# Patient Record
Sex: FEMALE | Race: WHITE | NOT HISPANIC OR LATINO | Employment: STUDENT | URBAN - METROPOLITAN AREA
[De-identification: names, ages, dates, MRNs, and addresses within clinical notes are randomized per-mention and may not be internally consistent; named-entity substitution may affect disease eponyms.]

---

## 2024-08-25 ENCOUNTER — HOSPITAL ENCOUNTER (EMERGENCY)
Facility: HOSPITAL | Age: 29
Discharge: HOME | End: 2024-08-25
Attending: EMERGENCY MEDICINE
Payer: COMMERCIAL

## 2024-08-25 ENCOUNTER — APPOINTMENT (OUTPATIENT)
Dept: CARDIOLOGY | Facility: HOSPITAL | Age: 29
End: 2024-08-25
Payer: COMMERCIAL

## 2024-08-25 VITALS
HEART RATE: 108 BPM | HEIGHT: 68 IN | SYSTOLIC BLOOD PRESSURE: 118 MMHG | OXYGEN SATURATION: 100 % | WEIGHT: 130 LBS | BODY MASS INDEX: 19.7 KG/M2 | RESPIRATION RATE: 18 BRPM | DIASTOLIC BLOOD PRESSURE: 79 MMHG | TEMPERATURE: 98.2 F

## 2024-08-25 DIAGNOSIS — N39.0 URINARY TRACT INFECTION WITHOUT HEMATURIA, SITE UNSPECIFIED: Primary | ICD-10-CM

## 2024-08-25 DIAGNOSIS — F11.13: ICD-10-CM

## 2024-08-25 LAB
ALBUMIN SERPL BCP-MCNC: 4.5 G/DL (ref 3.4–5)
ALP SERPL-CCNC: 72 U/L (ref 33–110)
ALT SERPL W P-5'-P-CCNC: 29 U/L (ref 7–45)
AMPHETAMINES UR QL SCN: ABNORMAL
ANION GAP SERPL CALC-SCNC: 14 MMOL/L (ref 10–20)
APPEARANCE UR: CLEAR
AST SERPL W P-5'-P-CCNC: 25 U/L (ref 9–39)
B-HCG SERPL-ACNC: <2 MIU/ML
BACTERIA #/AREA URNS AUTO: ABNORMAL /HPF
BARBITURATES UR QL SCN: ABNORMAL
BASOPHILS # BLD AUTO: 0.03 X10*3/UL (ref 0–0.1)
BASOPHILS NFR BLD AUTO: 0.5 %
BENZODIAZ UR QL SCN: ABNORMAL
BILIRUB SERPL-MCNC: 1.5 MG/DL (ref 0–1.2)
BILIRUB UR STRIP.AUTO-MCNC: NEGATIVE MG/DL
BUN SERPL-MCNC: 13 MG/DL (ref 6–23)
BZE UR QL SCN: ABNORMAL
CALCIUM SERPL-MCNC: 9.7 MG/DL (ref 8.6–10.3)
CANNABINOIDS UR QL SCN: ABNORMAL
CARDIAC TROPONIN I PNL SERPL HS: 3 NG/L (ref 0–13)
CARDIAC TROPONIN I PNL SERPL HS: <3 NG/L (ref 0–13)
CHLORIDE SERPL-SCNC: 98 MMOL/L (ref 98–107)
CO2 SERPL-SCNC: 26 MMOL/L (ref 21–32)
COLOR UR: ABNORMAL
CREAT SERPL-MCNC: 0.74 MG/DL (ref 0.5–1.05)
EGFRCR SERPLBLD CKD-EPI 2021: >90 ML/MIN/1.73M*2
EOSINOPHIL # BLD AUTO: 0.11 X10*3/UL (ref 0–0.7)
EOSINOPHIL NFR BLD AUTO: 1.9 %
ERYTHROCYTE [DISTWIDTH] IN BLOOD BY AUTOMATED COUNT: 12.2 % (ref 11.5–14.5)
FENTANYL+NORFENTANYL UR QL SCN: ABNORMAL
GLUCOSE SERPL-MCNC: 131 MG/DL (ref 74–99)
GLUCOSE UR STRIP.AUTO-MCNC: NORMAL MG/DL
HCT VFR BLD AUTO: 38.9 % (ref 36–46)
HGB BLD-MCNC: 13.1 G/DL (ref 12–16)
IMM GRANULOCYTES # BLD AUTO: 0.01 X10*3/UL (ref 0–0.7)
IMM GRANULOCYTES NFR BLD AUTO: 0.2 % (ref 0–0.9)
KETONES UR STRIP.AUTO-MCNC: NEGATIVE MG/DL
LEUKOCYTE ESTERASE UR QL STRIP.AUTO: NEGATIVE
LYMPHOCYTES # BLD AUTO: 1.63 X10*3/UL (ref 1.2–4.8)
LYMPHOCYTES NFR BLD AUTO: 28.2 %
MCH RBC QN AUTO: 30.1 PG (ref 26–34)
MCHC RBC AUTO-ENTMCNC: 33.7 G/DL (ref 32–36)
MCV RBC AUTO: 89 FL (ref 80–100)
METHADONE UR QL SCN: ABNORMAL
MONOCYTES # BLD AUTO: 0.37 X10*3/UL (ref 0.1–1)
MONOCYTES NFR BLD AUTO: 6.4 %
MUCOUS THREADS #/AREA URNS AUTO: ABNORMAL /LPF
NEUTROPHILS # BLD AUTO: 3.64 X10*3/UL (ref 1.2–7.7)
NEUTROPHILS NFR BLD AUTO: 62.8 %
NITRITE UR QL STRIP.AUTO: ABNORMAL
NRBC BLD-RTO: 0 /100 WBCS (ref 0–0)
OPIATES UR QL SCN: ABNORMAL
OXYCODONE+OXYMORPHONE UR QL SCN: ABNORMAL
PCP UR QL SCN: ABNORMAL
PH UR STRIP.AUTO: 7.5 [PH]
PLATELET # BLD AUTO: 158 X10*3/UL (ref 150–450)
POTASSIUM SERPL-SCNC: 3.6 MMOL/L (ref 3.5–5.3)
PROT SERPL-MCNC: 7.4 G/DL (ref 6.4–8.2)
PROT UR STRIP.AUTO-MCNC: NEGATIVE MG/DL
RBC # BLD AUTO: 4.35 X10*6/UL (ref 4–5.2)
RBC # UR STRIP.AUTO: NEGATIVE /UL
RBC #/AREA URNS AUTO: ABNORMAL /HPF
SARS-COV-2 RNA RESP QL NAA+PROBE: NOT DETECTED
SODIUM SERPL-SCNC: 134 MMOL/L (ref 136–145)
SP GR UR STRIP.AUTO: 1.01
SQUAMOUS #/AREA URNS AUTO: ABNORMAL /HPF
T4 FREE SERPL-MCNC: 1.07 NG/DL (ref 0.61–1.12)
TSH SERPL-ACNC: 0.26 MIU/L (ref 0.44–3.98)
UROBILINOGEN UR STRIP.AUTO-MCNC: NORMAL MG/DL
WBC # BLD AUTO: 5.8 X10*3/UL (ref 4.4–11.3)
WBC #/AREA URNS AUTO: ABNORMAL /HPF

## 2024-08-25 PROCEDURE — 80307 DRUG TEST PRSMV CHEM ANLYZR: CPT | Performed by: EMERGENCY MEDICINE

## 2024-08-25 PROCEDURE — 81001 URINALYSIS AUTO W/SCOPE: CPT | Mod: 59 | Performed by: EMERGENCY MEDICINE

## 2024-08-25 PROCEDURE — 93005 ELECTROCARDIOGRAM TRACING: CPT

## 2024-08-25 PROCEDURE — 84443 ASSAY THYROID STIM HORMONE: CPT | Performed by: EMERGENCY MEDICINE

## 2024-08-25 PROCEDURE — 84484 ASSAY OF TROPONIN QUANT: CPT | Performed by: EMERGENCY MEDICINE

## 2024-08-25 PROCEDURE — 99284 EMERGENCY DEPT VISIT MOD MDM: CPT

## 2024-08-25 PROCEDURE — 84075 ASSAY ALKALINE PHOSPHATASE: CPT | Performed by: EMERGENCY MEDICINE

## 2024-08-25 PROCEDURE — 96361 HYDRATE IV INFUSION ADD-ON: CPT

## 2024-08-25 PROCEDURE — 2500000001 HC RX 250 WO HCPCS SELF ADMINISTERED DRUGS (ALT 637 FOR MEDICARE OP): Performed by: EMERGENCY MEDICINE

## 2024-08-25 PROCEDURE — 85025 COMPLETE CBC W/AUTO DIFF WBC: CPT | Performed by: EMERGENCY MEDICINE

## 2024-08-25 PROCEDURE — 99285 EMERGENCY DEPT VISIT HI MDM: CPT | Performed by: EMERGENCY MEDICINE

## 2024-08-25 PROCEDURE — 96374 THER/PROPH/DIAG INJ IV PUSH: CPT

## 2024-08-25 PROCEDURE — 2500000004 HC RX 250 GENERAL PHARMACY W/ HCPCS (ALT 636 FOR OP/ED): Performed by: EMERGENCY MEDICINE

## 2024-08-25 PROCEDURE — 87635 SARS-COV-2 COVID-19 AMP PRB: CPT | Performed by: EMERGENCY MEDICINE

## 2024-08-25 PROCEDURE — 84439 ASSAY OF FREE THYROXINE: CPT | Performed by: EMERGENCY MEDICINE

## 2024-08-25 PROCEDURE — 96375 TX/PRO/DX INJ NEW DRUG ADDON: CPT

## 2024-08-25 PROCEDURE — 2500000002 HC RX 250 W HCPCS SELF ADMINISTERED DRUGS (ALT 637 FOR MEDICARE OP, ALT 636 FOR OP/ED): Performed by: EMERGENCY MEDICINE

## 2024-08-25 PROCEDURE — 84702 CHORIONIC GONADOTROPIN TEST: CPT | Performed by: EMERGENCY MEDICINE

## 2024-08-25 PROCEDURE — 36415 COLL VENOUS BLD VENIPUNCTURE: CPT | Performed by: EMERGENCY MEDICINE

## 2024-08-25 PROCEDURE — 2500000001 HC RX 250 WO HCPCS SELF ADMINISTERED DRUGS (ALT 637 FOR MEDICARE OP)

## 2024-08-25 RX ORDER — ALPRAZOLAM 0.25 MG/1
0.25 TABLET ORAL EVERY 6 HOURS PRN
Qty: 15 TABLET | Refills: 0 | Status: SHIPPED | OUTPATIENT
Start: 2024-08-25 | End: 2024-08-30

## 2024-08-25 RX ORDER — CLONIDINE 0.1 MG/24H
1 PATCH, EXTENDED RELEASE TRANSDERMAL
Status: DISCONTINUED | OUTPATIENT
Start: 2024-08-25 | End: 2024-08-25 | Stop reason: HOSPADM

## 2024-08-25 RX ORDER — ONDANSETRON HYDROCHLORIDE 2 MG/ML
4 INJECTION, SOLUTION INTRAVENOUS ONCE
Status: COMPLETED | OUTPATIENT
Start: 2024-08-25 | End: 2024-08-25

## 2024-08-25 RX ORDER — PROMETHAZINE HYDROCHLORIDE 25 MG/1
25 TABLET ORAL ONCE
Status: COMPLETED | OUTPATIENT
Start: 2024-08-25 | End: 2024-08-25

## 2024-08-25 RX ORDER — ALPRAZOLAM 0.5 MG/1
0.25 TABLET ORAL ONCE
Status: COMPLETED | OUTPATIENT
Start: 2024-08-25 | End: 2024-08-25

## 2024-08-25 RX ORDER — KETOROLAC TROMETHAMINE 15 MG/ML
15 INJECTION, SOLUTION INTRAMUSCULAR; INTRAVENOUS ONCE
Status: COMPLETED | OUTPATIENT
Start: 2024-08-25 | End: 2024-08-25

## 2024-08-25 RX ORDER — DICYCLOMINE HYDROCHLORIDE 20 MG/1
20 TABLET ORAL EVERY 8 HOURS PRN
Qty: 15 TABLET | Refills: 0 | Status: SHIPPED | OUTPATIENT
Start: 2024-08-25 | End: 2024-08-30

## 2024-08-25 RX ORDER — CEPHALEXIN 500 MG/1
500 CAPSULE ORAL 2 TIMES DAILY
Qty: 14 CAPSULE | Refills: 0 | Status: SHIPPED | OUTPATIENT
Start: 2024-08-25 | End: 2024-09-01

## 2024-08-25 RX ORDER — PROMETHAZINE HYDROCHLORIDE 25 MG/1
25 TABLET ORAL EVERY 8 HOURS PRN
Qty: 15 TABLET | Refills: 0 | Status: SHIPPED | OUTPATIENT
Start: 2024-08-25 | End: 2024-08-30

## 2024-08-25 RX ORDER — DICYCLOMINE HYDROCHLORIDE 10 MG/1
10 CAPSULE ORAL ONCE
Status: COMPLETED | OUTPATIENT
Start: 2024-08-25 | End: 2024-08-25

## 2024-08-25 RX ORDER — ALPRAZOLAM 0.5 MG/1
0.25 TABLET ORAL EVERY 6 HOURS PRN
Status: DISCONTINUED | OUTPATIENT
Start: 2024-08-25 | End: 2024-08-25 | Stop reason: HOSPADM

## 2024-08-25 RX ORDER — CEPHALEXIN 500 MG/1
500 CAPSULE ORAL ONCE
Status: COMPLETED | OUTPATIENT
Start: 2024-08-25 | End: 2024-08-25

## 2024-08-25 RX ADMIN — PROMETHAZINE HYDROCHLORIDE 25 MG: 25 TABLET ORAL at 15:49

## 2024-08-25 RX ADMIN — ALPRAZOLAM 0.25 MG: 0.5 TABLET ORAL at 14:38

## 2024-08-25 RX ADMIN — KETOROLAC TROMETHAMINE 15 MG: 15 INJECTION, SOLUTION INTRAMUSCULAR; INTRAVENOUS at 17:10

## 2024-08-25 RX ADMIN — DICYCLOMINE HYDROCHLORIDE 10 MG: 10 CAPSULE ORAL at 14:39

## 2024-08-25 RX ADMIN — CEPHALEXIN 500 MG: 500 CAPSULE ORAL at 17:10

## 2024-08-25 RX ADMIN — SODIUM CHLORIDE, POTASSIUM CHLORIDE, SODIUM LACTATE AND CALCIUM CHLORIDE 1000 ML: 600; 310; 30; 20 INJECTION, SOLUTION INTRAVENOUS at 14:05

## 2024-08-25 RX ADMIN — CLONIDINE 1 PATCH: 0.1 PATCH TRANSDERMAL at 15:49

## 2024-08-25 RX ADMIN — ONDANSETRON 4 MG: 2 INJECTION INTRAMUSCULAR; INTRAVENOUS at 14:05

## 2024-08-25 SDOH — HEALTH STABILITY: MENTAL HEALTH: ACTIVE SUICIDAL IDEATION WITH SPECIFIC PLAN AND INTENT (PAST 1 MONTH): NO

## 2024-08-25 SDOH — HEALTH STABILITY: MENTAL HEALTH: WISH TO BE DEAD (PAST 1 MONTH): YES

## 2024-08-25 SDOH — HEALTH STABILITY: MENTAL HEALTH: HOW DID YOU TRY TO KILL YOURSELF?: GATHERED MATERIAL TO HANG HERSELF

## 2024-08-25 SDOH — HEALTH STABILITY: MENTAL HEALTH: WHEN DID YOU TRY TO KILL YOURSELF?: THIS WAS A COUPLE YEARS AGO

## 2024-08-25 SDOH — HEALTH STABILITY: MENTAL HEALTH: IN THE PAST FEW WEEKS, HAVE YOU WISHED YOU WERE DEAD?: YES

## 2024-08-25 SDOH — HEALTH STABILITY: MENTAL HEALTH

## 2024-08-25 SDOH — SOCIAL STABILITY: SOCIAL NETWORK: EMOTIONAL SUPPORT GIVEN: REASSURE

## 2024-08-25 SDOH — HEALTH STABILITY: MENTAL HEALTH: HAVE YOU EVER TRIED TO KILL YOURSELF?: YES

## 2024-08-25 SDOH — HEALTH STABILITY: MENTAL HEALTH: SUICIDE ASSESSMENT: ADULT (C-SSRS)

## 2024-08-25 SDOH — HEALTH STABILITY: MENTAL HEALTH: SLEEP PATTERN: NAPS DURING THE DAY

## 2024-08-25 SDOH — HEALTH STABILITY: MENTAL HEALTH: ARE YOU HAVING THOUGHTS OF KILLING YOURSELF RIGHT NOW?: NO

## 2024-08-25 SDOH — HEALTH STABILITY: MENTAL HEALTH: IN THE PAST WEEK, HAVE YOU BEEN HAVING THOUGHTS ABOUT KILLING YOURSELF?: NO

## 2024-08-25 SDOH — ECONOMIC STABILITY: HOUSING INSECURITY

## 2024-08-25 SDOH — HEALTH STABILITY: MENTAL HEALTH: SUICIDAL BEHAVIOR (LIFETIME): YES

## 2024-08-25 SDOH — SOCIAL STABILITY: SOCIAL NETWORK: VISITOR BEHAVIORS: UNABLE TO ASSESS

## 2024-08-25 SDOH — HEALTH STABILITY: MENTAL HEALTH: SUICIDAL BEHAVIOR (3 MONTHS): NO

## 2024-08-25 SDOH — HEALTH STABILITY: MENTAL HEALTH: HAVE YOU WISHED YOU WERE DEAD OR WISHED YOU COULD GO TO SLEEP AND NOT WAKE UP?: YES

## 2024-08-25 SDOH — HEALTH STABILITY: MENTAL HEALTH: IN THE PAST FEW WEEKS, HAVE YOU FELT THAT YOU OR YOUR FAMILY WOULD BE BETTER OFF IF YOU WERE DEAD?: NO

## 2024-08-25 SDOH — HEALTH STABILITY: MENTAL HEALTH: NEEDS EXPRESSED: PHYSICAL

## 2024-08-25 SDOH — HEALTH STABILITY: MENTAL HEALTH: SUICIDAL BEHAVIOR (DESCRIPTION): WOULD LIKE TO DIE FROM TIME TO TIME BUT HAS NO INTENTION OF ACTING ON IT AT THIS TIME.

## 2024-08-25 SDOH — SOCIAL STABILITY: SOCIAL INSECURITY: FAMILY BEHAVIORS: UNABLE TO ASSESS

## 2024-08-25 SDOH — HEALTH STABILITY: MENTAL HEALTH: CONTENT: UNREMARKABLE

## 2024-08-25 SDOH — HEALTH STABILITY: MENTAL HEALTH: BEHAVIORS/MOOD: FLAT AFFECT;COOPERATIVE;CALM

## 2024-08-25 SDOH — SOCIAL STABILITY: SOCIAL NETWORK: PARENT/GUARDIAN/SIGNIFICANT OTHER INVOLVEMENT: NO INVOLVEMENT

## 2024-08-25 SDOH — HEALTH STABILITY: MENTAL HEALTH: ACTIVE SUICIDAL IDEATION WITH SOME INTENT TO ACT, WITHOUT SPECIFIC PLAN (PAST 1 MONTH): NO

## 2024-08-25 SDOH — HEALTH STABILITY: MENTAL HEALTH: NON-SPECIFIC ACTIVE SUICIDAL THOUGHTS (PAST 1 MONTH): NO

## 2024-08-25 SDOH — HEALTH STABILITY: MENTAL HEALTH: HAVE YOU ACTUALLY HAD ANY THOUGHTS OF KILLING YOURSELF?: YES

## 2024-08-25 ASSESSMENT — LIFESTYLE VARIABLES
EVER FELT BAD OR GUILTY ABOUT YOUR DRINKING: NO
ORIENTATION AND CLOUDING OF SENSORIUM: ORIENTED AND CAN DO SERIAL ADDITIONS
TOTAL SCORE: 0
NAUSEA AND VOMITING: MILD NAUSEA WITH NO VOMITING
AGITATION: NORMAL ACTIVITY
HAVE YOU EVER FELT YOU SHOULD CUT DOWN ON YOUR DRINKING: NO
TOTAL SCORE: 4
ANXIETY: MILDLY ANXIOUS
HAVE PEOPLE ANNOYED YOU BY CRITICIZING YOUR DRINKING: NO
TACTILE DISTURBANCES: VERY MILD ITCHING, PINS AND NEEDLES, BURNING OR NUMBNESS
AUDITORY DISTURBANCES: NOT PRESENT
VISUAL DISTURBANCES: NOT PRESENT
PAROXYSMAL SWEATS: BARELY PERCEPTIBLE SWEATING, PALMS MOIST
HEADACHE, FULLNESS IN HEAD: NOT PRESENT
TREMOR: NO TREMOR
EVER HAD A DRINK FIRST THING IN THE MORNING TO STEADY YOUR NERVES TO GET RID OF A HANGOVER: NO

## 2024-08-25 ASSESSMENT — COLUMBIA-SUICIDE SEVERITY RATING SCALE - C-SSRS
5. HAVE YOU STARTED TO WORK OUT OR WORKED OUT THE DETAILS OF HOW TO KILL YOURSELF? DO YOU INTEND TO CARRY OUT THIS PLAN?: NO
2. HAVE YOU ACTUALLY HAD ANY THOUGHTS OF KILLING YOURSELF?: YES
1. IN THE PAST MONTH, HAVE YOU WISHED YOU WERE DEAD OR WISHED YOU COULD GO TO SLEEP AND NOT WAKE UP?: YES
4. HAVE YOU HAD THESE THOUGHTS AND HAD SOME INTENTION OF ACTING ON THEM?: NO
6. HAVE YOU EVER DONE ANYTHING, STARTED TO DO ANYTHING, OR PREPARED TO DO ANYTHING TO END YOUR LIFE?: YES
6. HAVE YOU EVER DONE ANYTHING, STARTED TO DO ANYTHING, OR PREPARED TO DO ANYTHING TO END YOUR LIFE?: YES

## 2024-08-25 ASSESSMENT — PAIN SCALES - GENERAL
PAINLEVEL_OUTOF10: 4
PAINLEVEL_OUTOF10: 7
PAINLEVEL_OUTOF10: 4

## 2024-08-25 ASSESSMENT — PAIN - FUNCTIONAL ASSESSMENT
PAIN_FUNCTIONAL_ASSESSMENT: 0-10
PAIN_FUNCTIONAL_ASSESSMENT: 0-10

## 2024-08-25 NOTE — ED PROVIDER NOTES
HPI   Chief Complaint   Patient presents with    Withdrawal     Woke up this morning and felt really out of it. Pt states pain all over. Wants help/detox. Used a day or two ago not really sure. Slept for a long time. Police were called to home due to pt being reckless per family. Has warrants but not under arrest.        The patient is medically cleared.    This is a 29-year-old female who presents to the emergency room and withdrawal.  The patient states that she uses Xanax 1 mg on a daily basis, heroin that she smokes daily, and intermittently uses methamphetamine.  She states that she last did Xanax about 2 days ago, and lasted heroin yesterday.  Woke up today and feels that she is in withdrawals.  She has aches and pains all over, nausea, abdominal cramping, and feels sweaty hot and clammy.  She never been through withdrawals before and is looking for help.  She was trying to get placed into rehab but her family would not let her go to the detox center.  And they called police today.  She is not under percent sure why they called the police but she did have a passing episode at home if she is to get some blood on her foot because her own blood does make her pass out.  She otherwise has no chest pain or shortness of breath.  The patient states that she did have thoughts of hurting herself when they called the police because she did not want to go to detention, and frequently does think about hurting herself but does not have an actual plan.  She does not believe that she needs any evaluation for depression as she would not actually do it but again does state that she thinks about it frequently.    At this time she denies being pregnant.  But does not recall her last menstrual period.  She has no known drug allergies but is allergic to eggs.  Has not been COVID vaccinated.  Has not had any fevers or cough or congestion in the past few days.  She does not utilize any IV drugs.              Patient History   History  reviewed. No pertinent past medical history.  History reviewed. No pertinent surgical history.  No family history on file.  Social History     Tobacco Use    Smoking status: Every Day     Current packs/day: 0.25     Types: Cigarettes    Smokeless tobacco: Never   Substance Use Topics    Alcohol use: Never    Drug use: Yes     Types: Methamphetamines       Physical Exam   ED Triage Vitals [08/25/24 1307]   Temperature Heart Rate Respirations BP   36.8 °C (98.2 °F) (!) 128 20 132/80      Pulse Ox Temp src Heart Rate Source Patient Position   100 % -- Monitor Lying      BP Location FiO2 (%)     Left arm --       Physical Exam  Constitutional:       Appearance: Normal appearance. She is normal weight.   HENT:      Head: Normocephalic.      Nose: Nose normal.      Mouth/Throat:      Mouth: Mucous membranes are dry.   Eyes:      Extraocular Movements: Extraocular movements intact.      Conjunctiva/sclera: Conjunctivae normal.      Pupils: Pupils are equal, round, and reactive to light.   Cardiovascular:      Rate and Rhythm: Regular rhythm. Tachycardia present.      Heart sounds: No murmur heard.  Pulmonary:      Effort: Pulmonary effort is normal.      Breath sounds: Normal breath sounds.   Abdominal:      General: Abdomen is flat. Bowel sounds are normal.      Palpations: Abdomen is soft.   Musculoskeletal:         General: No swelling or tenderness. Normal range of motion.      Cervical back: Normal range of motion.      Right lower leg: No edema.      Left lower leg: No edema.   Skin:     General: Skin is warm.      Capillary Refill: Capillary refill takes less than 2 seconds.   Neurological:      General: No focal deficit present.      Mental Status: She is alert and oriented to person, place, and time.           ED Course & MDM   Diagnoses as of 08/25/24 1800   Urinary tract infection without hematuria, site unspecified   Opioid abuse with withdrawal (Multi)                 No data recorded                                  Medical Decision Making  The patient was seen by the resident/fellow.  I have personally performed a substantive portion of the encounter.  I have seen and examined the patient; agree with the workup, evaluation, MDM, management and diagnosis.  The care plan has been discussed with the resident; I have reviewed the resident's note and agree with the documented findings.      At this time, the patient does take Xanax daily 1 mg therefore we will wean off of the Xanax and start her here at 0.25 mg I notified the patient that Xanax withdrawal can be deadly and has to be weaned over 5 or 6 days.  The heroin withdrawal is symptom control and the patient be given clonidine, Bentyl, Phenergan, IV fluids and Toradol.  Will wait on the Toradol to make sure she is not pregnant first before providing it.  The other medications will be started now.    Given the patient has had thoughts of hurting herself in the past and per her words frequently, we will also obtain an EPAT evaluation.  If she is cleared from EPAT, then [facility] will be utilized to help her get into a rehab facility or detox facility.    The patient was seen by EPAT, and she was deemed low risk.  Therefore she is cleared to leave at her leisure as she is not suicidal has no plan at this time.  She did state to talk to [facility], and spoke with Enoch who is going to try to get her placed this evening.  He is supposed to be on his way up here, and they are discussed the plan.  In the meantime if they cannot take her, I have sent meds to her pharmacy for UTI including Keflex and for withdrawal from Xanax including Xanax low-dose and short course to wean over the next 5 days, Phenergan, Bentyl, and she is to keep the clonidine patch on as well.        Procedure  Procedures     Savanna Costello MD  Resident  08/25/24 3734       Nemesio Calle DO  08/25/24 8403

## 2024-08-25 NOTE — PROGRESS NOTES
EPAT - Social Work Psychiatric Assessment    Arrival Details  Mode of Arrival: Ambulance  Admission Source: Home  Admission Type: Voluntary  EPAT Assessment Start Date: 08/25/24  EPAT Assessment Start Time: 1601  Name of : TIMMY Stein    History of Present Illness  Admission Reason: Psychiatric assessment ordered  HPI: A review of previous and current electronic records was conducted prior to the patient interview. Circumstance in which the patient has presented to the ED and initial clinical impressions as documented in the ED Provider Note upon admission: This is a 29-year-old female who presents to the emergency room and withdrawal.  The patient states that she uses Xanax 1 mg on a daily basis, heroin that she smokes daily, and intermittently uses methamphetamine.  She states that she last did Xanax about 2 days ago, and lasted heroin yesterday.  Woke up today and feels that she is in withdrawals.  She has aches and pains all over, nausea, abdominal cramping, and feels sweaty hot and clammy.  She never been through withdrawals before and is looking for help.  She was trying to get placed into rehab but her family would not let her go to the detox center.  And they called police today.  She is not under percent sure why they called the police but she did have a passing episode at home if she is to get some blood on her foot because her own blood does make her pass out.  She otherwise has no chest pain or shortness of breath.  The patient states that she did have thoughts of hurting herself when they called the police because she did not want to go to care home, and frequently does think about hurting herself but does not have an actual plan.  She does not believe that she needs any evaluation for depression as she would not actually do it but again does state that she thinks about it frequently.     ED Provider reason for psych assessment referral: Given the patient has had thoughts of hurting herself  in the past and per her words frequently, we will also obtain an EPAT evaluation.  If she is cleared from EPAT, then [facility] will be utilized to help her get into a rehab facility or detox facility.    SW Readmission Information   Readmission within 30 Days: No    Brief MSE w/ inclusion of psychiatric symptoms.  General: Sitting on bed for interview by video, calm.  Orientation: x3   Mood: Depressed. Details: She is prone to be pessimistic, have low energy, feelings of dysphoria, difficulty concentrating, some sleep difficulty.  Affect: Limited range, as appropriate for the situation  Psychiatric symptoms re HI/SI: The patient does not endorse SI outside of controllable fleeting thoughts with no intention of developing plan, no HI or any psychotic symptoms.   Psychotic symptoms: None. The patient does not endorse nor demonstrate presence of internal stimulation, hallucinations or delusions.  Insight/Judgment: Poor prior to this ED visit, but good now that she has chosen to enter into detox and recovery again.    Past Psychiatric History/Meds/Treatments  Past Psychiatric History: Previous Psychiatric Inpatient Hospitalizations: When she had suicide plan 2 years ago.   Previous Substance Abuse Treatment: Yes. [facility], [facility]  Past Psychiatric Meds/Treatments: None. She agreed to the suggestion of seeking psychiatry for assessment for mood medication upon conclusion of detox presently.    Current Mental Health Contacts   Name/Phone Number: None  Provider Name/Phone Number: None    Support System: Immediate family; Significant other; Mother    Living Arrangement: House  Feels Safe Living in Home: Yes    Current or Previous  Service: None  Social/Cultural History  Cultural Requests During Hospitalization: None  Spiritual Requests During Hospitalization: None  Legal Concerns: None at this time; Had been in retirement previously    Drug Screening  Have you used any  "substances (canabis, cocaine, heroin, hallucinogens, inhalants, etc.) in the past 12 months?: Yes  Have you used any prescription drugs other than prescribed in the past 12 months?: Yes  Is a toxicology screen needed?: Yes    Stage of Change: Preparation; Plans on returning to detox and recovery program immediately.    Drug use history  Substance abuse note imported via [employer] 3/25/24    Patient is addicted to heroin, Xanax daily 1 mg, as well as intermittent methamphetamine.  Has stopped all Xanax for 2 days, and stopped heroin as well since yesterday and is feeling withdrawal symptoms.  Also feels like frequently that she wants to die and has thought about it but does not have a plan.  Did think about it today when her mom called the police because she did not want to go to prison but again it was a fleeting thought.  Request evaluation for suicidal ideation and depression with drug abuse possible dual diagnosis admission. Result/Plan: The patient was recommended to IOP upon discharge.    Substance Use History (type, frequency, amount, route of use, onset of use, last use):   Opioids: 1g/day, denies IVDU  Alcohol: denies  Benzodiazepines: reports xanax \"couple bars\" most days  Cocaine: denies  PCP: denies  Methamphetamine/ecstasy: denies  Cannabis: denies     Violence Risk Assessment  Assessment of Violence: On admission  Thoughts of Harm to Others: No    Self Harm/Suicidal Ideation Plan: None other than fleeting thoughts  Access to Lethal Methods : No    Ability to Assess Risk Screen  Risk Screen - Ability to Assess: Able to be screened  Ask Suicide-Screening Questions  1. In the past few weeks, have you wished you were dead?: Yes  2. In the past few weeks, have you felt that you or your family would be better off if you were dead?: No  3. In the past week, have you been having thoughts about killing yourself?: No  4. Have you ever tried to kill yourself?: Yes  How did you try to kill yourself?: " gathered material to hang herself  When did you try to kill yourself?: this was a couple years ago  5. Are you having thoughts of killing yourself right now?: No  Calculated Risk Score: Potential Risk  [place] Suicide Severity Rating Scale (Screener/Recent Self-Report)  1. Wish to be Dead (Past 1 Month): Yes  2. Non-Specific Active Suicidal Thoughts (Past 1 Month): No  3. Active Suicidal Ideation with any Methods (Not Plan) Without Intent to Act (Past 1 Month): No  4. Active Suicidal Ideation with Some Intent to Act, Without Specific Plan (Past 1 Month): No  5. Active Suicidal Ideation with Specific Plan and Intent (Past 1 Month): No  6. Suicidal Behavior (Lifetime): Yes  6. Suicidal Behavior (3 Months): No  6. Suicidal Behavior (Description): would like to die from time to time but has no intention of acting on it at this time.  Calculated C-SSRS Risk Score (Lifetime/Recent): Moderate Risk    Clinical Interpretation Documentation of SI risk  Risk Level: Low suicide risk, considering remote attempt yrs ago and no plans currenlty/recently    Psychiatric Impression and Plan of Care    DIAGNOSTIC IMPRESSION based upon previous and current evaluative information: benzodiazepine dependence; opioid dependence; major depressive episode, recurrent, moderate    ASSESSMENT NOTES: The patient was interviewed for suicide risk, which yielded low risk result. The nursing note shows a response of suicide behavior over the past 3 months, which fortunately is incorrect. The patient clarified that she gathered materials to attempt hanging 2 years ago. That was the only incident. As stated in the provider note, her recent SI has been fleeting and she has no intent or desire to develop any plan. She has had depression symptoms, never been on an anti-depressant, so I did recommend she talk with psychiatry after detox. She wants to consult with [facility] for detox and recovery. [facility] has been contacted and will meet with her to offer  placement today.     CONTRIBUTING factors to this ED visit that were present in prior assessments: Relapse; Perhaps need for an anti-depressant that has not been previously considered.    [facility] services considered to address substance use: Yes. Referred as plan of care    AGITATION Assessment: Is patient presenting as agitated? No    PLAN OF CARE: DISCHARGE. EPAT consulted with Dr. Calle after psych assessment, concurring that she is actually low risk for psych and appropriate for [facility] referral, which she is requesting.   Specific Resources Provided to Patient: [facility] to meet presently  CM Notified: n/a  PHP/IOP Recommended: Deferred to CD treatment team    Outcome/Disposition  Patient's Perception of Outcome Achieved: Patient in agreement with plan  Assessment, Recommendations and Risk Level Reviewed with: Dr. Calle  Contact Names:    Kika Benderaurora (Parent)  - [phone]   Rohit Ortiz (Significant Other)  - [phone]  EPAT Assessment Completed Time: 1629  Patient Disposition: Home

## 2024-08-25 NOTE — DISCHARGE INSTRUCTIONS
Thank you for visiting the [facility] emergency department.  Please follow-up with Enoch, the representative from [facility] to further assist you with your care.  Please also follow-up with your referred primary care physician, Dr. Taylor, for additional medical and psychiatric management.  Please come back to the emergency department if you experience any any new or worsening medical or psychological disturbances.

## 2024-08-25 NOTE — PROGRESS NOTES
Social Work Note - Brief Psychiatric Assessment note    This is a brief summary, to be replaced by a comprehensive assessment note momentarily.  The patient was interviewed for suicide risk, which yielded low risk result. The nursing note shows a response of suicide behavior over the past 3 months, which fortunately is incorrect. The patient clarified that she gathered materials to attempt hanging 2 years ago. That was the only incident. As stated in the provider note, her recent SI has been fleeting and she has no intent or desire to develop any plan. She has had depression symptoms, never been on an anti-depressant, so I did recommend she talk with psychiatry after detox. She wants to consult with [facility] for detox and recovery. [facility] has been contacted and will meet with her to offer placement today.

## 2024-08-26 LAB
ATRIAL RATE: 108 BPM
HOLD SPECIMEN: NORMAL
P AXIS: 66 DEGREES
P OFFSET: 208 MS
P ONSET: 151 MS
PR INTERVAL: 140 MS
Q ONSET: 221 MS
QRS COUNT: 18 BEATS
QRS DURATION: 104 MS
QT INTERVAL: 346 MS
QTC CALCULATION(BAZETT): 463 MS
QTC FREDERICIA: 420 MS
R AXIS: 42 DEGREES
T AXIS: 50 DEGREES
T OFFSET: 394 MS
VENTRICULAR RATE: 108 BPM

## 2024-09-18 LAB
ATRIAL RATE: 108 BPM
P AXIS: 66 DEGREES
P OFFSET: 208 MS
P ONSET: 151 MS
PR INTERVAL: 140 MS
Q ONSET: 221 MS
QRS COUNT: 18 BEATS
QRS DURATION: 104 MS
QT INTERVAL: 346 MS
QTC CALCULATION(BAZETT): 463 MS
QTC FREDERICIA: 420 MS
R AXIS: 42 DEGREES
T AXIS: 50 DEGREES
T OFFSET: 394 MS
VENTRICULAR RATE: 108 BPM

## 2024-09-28 ENCOUNTER — HOSPITAL ENCOUNTER (INPATIENT)
Facility: HOSPITAL | Age: 29
End: 2024-09-28
Attending: EMERGENCY MEDICINE | Admitting: INTERNAL MEDICINE
Payer: COMMERCIAL

## 2024-09-28 ENCOUNTER — APPOINTMENT (OUTPATIENT)
Dept: CARDIOLOGY | Facility: HOSPITAL | Age: 29
End: 2024-09-28
Payer: COMMERCIAL

## 2024-09-28 DIAGNOSIS — F13.930: Primary | ICD-10-CM

## 2024-09-28 DIAGNOSIS — R11.2 NAUSEA AND VOMITING, UNSPECIFIED VOMITING TYPE: ICD-10-CM

## 2024-09-28 DIAGNOSIS — E56.9 VITAMIN DEFICIENCY: ICD-10-CM

## 2024-09-28 DIAGNOSIS — R10.9 ABDOMINAL DISCOMFORT: ICD-10-CM

## 2024-09-28 DIAGNOSIS — F41.9 ANXIETY: ICD-10-CM

## 2024-09-28 DIAGNOSIS — G47.00 INSOMNIA, UNSPECIFIED TYPE: ICD-10-CM

## 2024-09-28 PROBLEM — F11.93 HEROIN WITHDRAWAL (MULTI): Status: ACTIVE | Noted: 2024-09-28

## 2024-09-28 PROBLEM — F15.20 METHAMPHETAMINE DEPENDENCE (MULTI): Chronic | Status: ACTIVE | Noted: 2024-09-28

## 2024-09-28 PROBLEM — R45.851 SUICIDAL IDEATION: Status: ACTIVE | Noted: 2024-09-28

## 2024-09-28 LAB
ALBUMIN SERPL BCP-MCNC: 4.3 G/DL (ref 3.4–5)
ALP SERPL-CCNC: 90 U/L (ref 33–110)
ALT SERPL W P-5'-P-CCNC: 56 U/L (ref 7–45)
AMPHETAMINES UR QL SCN: ABNORMAL
ANION GAP SERPL CALC-SCNC: 11 MMOL/L (ref 10–20)
APAP SERPL-MCNC: <10 UG/ML
AST SERPL W P-5'-P-CCNC: 36 U/L (ref 9–39)
ATRIAL RATE: 121 BPM
B-HCG SERPL-ACNC: <2 MIU/ML
BARBITURATES UR QL SCN: ABNORMAL
BASOPHILS # BLD AUTO: 0.03 X10*3/UL (ref 0–0.1)
BASOPHILS NFR BLD AUTO: 0.6 %
BENZODIAZ UR QL SCN: ABNORMAL
BILIRUB SERPL-MCNC: 1.2 MG/DL (ref 0–1.2)
BUN SERPL-MCNC: 21 MG/DL (ref 6–23)
BZE UR QL SCN: ABNORMAL
CALCIUM SERPL-MCNC: 9 MG/DL (ref 8.6–10.3)
CANNABINOIDS UR QL SCN: ABNORMAL
CHLORIDE SERPL-SCNC: 104 MMOL/L (ref 98–107)
CO2 SERPL-SCNC: 26 MMOL/L (ref 21–32)
CREAT SERPL-MCNC: 1.02 MG/DL (ref 0.5–1.05)
EGFRCR SERPLBLD CKD-EPI 2021: 77 ML/MIN/1.73M*2
EOSINOPHIL # BLD AUTO: 0.07 X10*3/UL (ref 0–0.7)
EOSINOPHIL NFR BLD AUTO: 1.3 %
ERYTHROCYTE [DISTWIDTH] IN BLOOD BY AUTOMATED COUNT: 12.7 % (ref 11.5–14.5)
ETHANOL SERPL-MCNC: <10 MG/DL
FENTANYL+NORFENTANYL UR QL SCN: ABNORMAL
GLUCOSE SERPL-MCNC: 119 MG/DL (ref 74–99)
HCT VFR BLD AUTO: 35.3 % (ref 36–46)
HGB BLD-MCNC: 11.5 G/DL (ref 12–16)
HIV 1+2 AB+HIV1 P24 AG SERPL QL IA: NONREACTIVE
IMM GRANULOCYTES # BLD AUTO: 0.01 X10*3/UL (ref 0–0.7)
IMM GRANULOCYTES NFR BLD AUTO: 0.2 % (ref 0–0.9)
LYMPHOCYTES # BLD AUTO: 1.16 X10*3/UL (ref 1.2–4.8)
LYMPHOCYTES NFR BLD AUTO: 22.3 %
MCH RBC QN AUTO: 29.9 PG (ref 26–34)
MCHC RBC AUTO-ENTMCNC: 32.6 G/DL (ref 32–36)
MCV RBC AUTO: 92 FL (ref 80–100)
METHADONE UR QL SCN: ABNORMAL
MONOCYTES # BLD AUTO: 0.28 X10*3/UL (ref 0.1–1)
MONOCYTES NFR BLD AUTO: 5.4 %
NEUTROPHILS # BLD AUTO: 3.65 X10*3/UL (ref 1.2–7.7)
NEUTROPHILS NFR BLD AUTO: 70.2 %
NRBC BLD-RTO: 0 /100 WBCS (ref 0–0)
OPIATES UR QL SCN: ABNORMAL
OXYCODONE+OXYMORPHONE UR QL SCN: ABNORMAL
P AXIS: 67 DEGREES
P OFFSET: 208 MS
P ONSET: 155 MS
PCP UR QL SCN: ABNORMAL
PLATELET # BLD AUTO: 161 X10*3/UL (ref 150–450)
POTASSIUM SERPL-SCNC: 4 MMOL/L (ref 3.5–5.3)
PR INTERVAL: 150 MS
PROT SERPL-MCNC: 7.3 G/DL (ref 6.4–8.2)
Q ONSET: 230 MS
QRS COUNT: 20 BEATS
QRS DURATION: 88 MS
QT INTERVAL: 302 MS
QTC CALCULATION(BAZETT): 428 MS
QTC FREDERICIA: 381 MS
R AXIS: 61 DEGREES
RBC # BLD AUTO: 3.84 X10*6/UL (ref 4–5.2)
SALICYLATES SERPL-MCNC: <3 MG/DL
SODIUM SERPL-SCNC: 137 MMOL/L (ref 136–145)
T AXIS: 58 DEGREES
T OFFSET: 381 MS
T4 FREE SERPL-MCNC: 1.11 NG/DL (ref 0.61–1.12)
TSH SERPL-ACNC: 0.25 MIU/L (ref 0.44–3.98)
VENTRICULAR RATE: 121 BPM
WBC # BLD AUTO: 5.2 X10*3/UL (ref 4.4–11.3)

## 2024-09-28 PROCEDURE — 2500000004 HC RX 250 GENERAL PHARMACY W/ HCPCS (ALT 636 FOR OP/ED): Performed by: NURSE PRACTITIONER

## 2024-09-28 PROCEDURE — 99285 EMERGENCY DEPT VISIT HI MDM: CPT | Performed by: EMERGENCY MEDICINE

## 2024-09-28 PROCEDURE — 99285 EMERGENCY DEPT VISIT HI MDM: CPT

## 2024-09-28 PROCEDURE — 82947 ASSAY GLUCOSE BLOOD QUANT: CPT | Performed by: EMERGENCY MEDICINE

## 2024-09-28 PROCEDURE — 96361 HYDRATE IV INFUSION ADD-ON: CPT

## 2024-09-28 PROCEDURE — 84443 ASSAY THYROID STIM HORMONE: CPT

## 2024-09-28 PROCEDURE — 2500000001 HC RX 250 WO HCPCS SELF ADMINISTERED DRUGS (ALT 637 FOR MEDICARE OP)

## 2024-09-28 PROCEDURE — 87389 HIV-1 AG W/HIV-1&-2 AB AG IA: CPT | Mod: STJLAB

## 2024-09-28 PROCEDURE — 93005 ELECTROCARDIOGRAM TRACING: CPT

## 2024-09-28 PROCEDURE — 80320 DRUG SCREEN QUANTALCOHOLS: CPT | Performed by: EMERGENCY MEDICINE

## 2024-09-28 PROCEDURE — 2500000004 HC RX 250 GENERAL PHARMACY W/ HCPCS (ALT 636 FOR OP/ED)

## 2024-09-28 PROCEDURE — 84702 CHORIONIC GONADOTROPIN TEST: CPT | Performed by: EMERGENCY MEDICINE

## 2024-09-28 PROCEDURE — 2500000005 HC RX 250 GENERAL PHARMACY W/O HCPCS

## 2024-09-28 PROCEDURE — 85025 COMPLETE CBC W/AUTO DIFF WBC: CPT | Performed by: EMERGENCY MEDICINE

## 2024-09-28 PROCEDURE — 2500000001 HC RX 250 WO HCPCS SELF ADMINISTERED DRUGS (ALT 637 FOR MEDICARE OP): Performed by: NURSE PRACTITIONER

## 2024-09-28 PROCEDURE — 36415 COLL VENOUS BLD VENIPUNCTURE: CPT

## 2024-09-28 PROCEDURE — 84439 ASSAY OF FREE THYROXINE: CPT

## 2024-09-28 PROCEDURE — 94760 N-INVAS EAR/PLS OXIMETRY 1: CPT

## 2024-09-28 PROCEDURE — 2500000002 HC RX 250 W HCPCS SELF ADMINISTERED DRUGS (ALT 637 FOR MEDICARE OP, ALT 636 FOR OP/ED): Performed by: NURSE PRACTITIONER

## 2024-09-28 PROCEDURE — 1200000002 HC GENERAL ROOM WITH TELEMETRY DAILY

## 2024-09-28 PROCEDURE — 80307 DRUG TEST PRSMV CHEM ANLYZR: CPT | Performed by: EMERGENCY MEDICINE

## 2024-09-28 PROCEDURE — 36415 COLL VENOUS BLD VENIPUNCTURE: CPT | Performed by: EMERGENCY MEDICINE

## 2024-09-28 PROCEDURE — 96360 HYDRATION IV INFUSION INIT: CPT

## 2024-09-28 RX ORDER — FOLIC ACID 1 MG/1
1 TABLET ORAL DAILY
Status: DISCONTINUED | OUTPATIENT
Start: 2024-09-28 | End: 2024-09-30 | Stop reason: HOSPADM

## 2024-09-28 RX ORDER — HYDROXYZINE HYDROCHLORIDE 25 MG/1
25 TABLET, FILM COATED ORAL EVERY 4 HOURS PRN
Status: DISCONTINUED | OUTPATIENT
Start: 2024-09-28 | End: 2024-09-30 | Stop reason: HOSPADM

## 2024-09-28 RX ORDER — DIAZEPAM 5 MG/ML
10 INJECTION, SOLUTION INTRAMUSCULAR; INTRAVENOUS EVERY 2 HOUR PRN
Status: DISCONTINUED | OUTPATIENT
Start: 2024-09-28 | End: 2024-09-28

## 2024-09-28 RX ORDER — ACETAMINOPHEN 325 MG/1
650 TABLET ORAL EVERY 4 HOURS PRN
Status: DISCONTINUED | OUTPATIENT
Start: 2024-09-28 | End: 2024-09-28

## 2024-09-28 RX ORDER — DICYCLOMINE HYDROCHLORIDE 10 MG/1
10 CAPSULE ORAL EVERY 6 HOURS PRN
Status: DISCONTINUED | OUTPATIENT
Start: 2024-09-28 | End: 2024-09-30 | Stop reason: HOSPADM

## 2024-09-28 RX ORDER — QUETIAPINE FUMARATE 25 MG/1
25 TABLET, FILM COATED ORAL EVERY 12 HOURS
Status: DISCONTINUED | OUTPATIENT
Start: 2024-09-28 | End: 2024-09-30 | Stop reason: HOSPADM

## 2024-09-28 RX ORDER — ACETAMINOPHEN 325 MG/1
650 TABLET ORAL EVERY 6 HOURS PRN
Status: DISCONTINUED | OUTPATIENT
Start: 2024-09-28 | End: 2024-09-30 | Stop reason: HOSPADM

## 2024-09-28 RX ORDER — BUPRENORPHINE 2 MG/1
4 TABLET SUBLINGUAL ONCE AS NEEDED
Status: COMPLETED | OUTPATIENT
Start: 2024-09-28 | End: 2024-09-29

## 2024-09-28 RX ORDER — ACETAMINOPHEN 325 MG/1
650 TABLET ORAL EVERY 4 HOURS PRN
Status: DISCONTINUED | OUTPATIENT
Start: 2024-09-28 | End: 2024-09-30 | Stop reason: HOSPADM

## 2024-09-28 RX ORDER — MULTIVIT-MIN/IRON FUM/FOLIC AC 7.5 MG-4
1 TABLET ORAL DAILY
Status: DISCONTINUED | OUTPATIENT
Start: 2024-09-28 | End: 2024-09-30 | Stop reason: HOSPADM

## 2024-09-28 RX ORDER — LORAZEPAM 1 MG/1
2 TABLET ORAL EVERY 2 HOUR PRN
Status: DISCONTINUED | OUTPATIENT
Start: 2024-09-28 | End: 2024-09-28

## 2024-09-28 RX ORDER — BUPRENORPHINE 2 MG/1
2 TABLET SUBLINGUAL EVERY 4 HOURS PRN
Status: DISCONTINUED | OUTPATIENT
Start: 2024-09-28 | End: 2024-09-30 | Stop reason: HOSPADM

## 2024-09-28 RX ORDER — ONDANSETRON HYDROCHLORIDE 2 MG/ML
4 INJECTION, SOLUTION INTRAVENOUS EVERY 8 HOURS PRN
Status: DISCONTINUED | OUTPATIENT
Start: 2024-09-28 | End: 2024-09-30 | Stop reason: HOSPADM

## 2024-09-28 RX ORDER — ACETAMINOPHEN 650 MG/1
650 SUPPOSITORY RECTAL EVERY 4 HOURS PRN
Status: DISCONTINUED | OUTPATIENT
Start: 2024-09-28 | End: 2024-09-28

## 2024-09-28 RX ORDER — LORAZEPAM 1 MG/1
1 TABLET ORAL EVERY 2 HOUR PRN
Status: DISCONTINUED | OUTPATIENT
Start: 2024-09-28 | End: 2024-09-28

## 2024-09-28 RX ORDER — ACETAMINOPHEN 160 MG/5ML
650 SOLUTION ORAL EVERY 4 HOURS PRN
Status: DISCONTINUED | OUTPATIENT
Start: 2024-09-28 | End: 2024-09-28

## 2024-09-28 RX ORDER — TRAZODONE HYDROCHLORIDE 100 MG/1
100 TABLET ORAL NIGHTLY PRN
Status: DISCONTINUED | OUTPATIENT
Start: 2024-09-28 | End: 2024-09-30 | Stop reason: HOSPADM

## 2024-09-28 RX ORDER — DIAZEPAM 5 MG/ML
10 INJECTION, SOLUTION INTRAMUSCULAR; INTRAVENOUS EVERY 4 HOURS PRN
Status: DISCONTINUED | OUTPATIENT
Start: 2024-09-28 | End: 2024-09-28

## 2024-09-28 RX ORDER — IBUPROFEN 400 MG/1
400 TABLET ORAL EVERY 8 HOURS PRN
Status: DISCONTINUED | OUTPATIENT
Start: 2024-09-28 | End: 2024-09-30 | Stop reason: HOSPADM

## 2024-09-28 RX ORDER — LORAZEPAM 0.5 MG/1
0.5 TABLET ORAL EVERY 2 HOUR PRN
Status: DISCONTINUED | OUTPATIENT
Start: 2024-09-28 | End: 2024-09-28

## 2024-09-28 RX ORDER — SODIUM CHLORIDE 9 MG/ML
75 INJECTION, SOLUTION INTRAVENOUS CONTINUOUS
Status: ACTIVE | OUTPATIENT
Start: 2024-09-28 | End: 2024-09-29

## 2024-09-28 RX ORDER — ONDANSETRON 4 MG/1
4 TABLET, FILM COATED ORAL EVERY 8 HOURS PRN
Status: DISCONTINUED | OUTPATIENT
Start: 2024-09-28 | End: 2024-09-30 | Stop reason: HOSPADM

## 2024-09-28 RX ORDER — CLONIDINE HYDROCHLORIDE 0.1 MG/1
0.1 TABLET ORAL EVERY 4 HOURS PRN
Status: DISCONTINUED | OUTPATIENT
Start: 2024-09-28 | End: 2024-09-30 | Stop reason: HOSPADM

## 2024-09-28 SDOH — HEALTH STABILITY: MENTAL HEALTH: DELUSIONS: CONTROLLED

## 2024-09-28 SDOH — HEALTH STABILITY: MENTAL HEALTH: HOW OFTEN DO YOU HAVE A DRINK CONTAINING ALCOHOL?: NEVER

## 2024-09-28 SDOH — SOCIAL STABILITY: SOCIAL INSECURITY: HAVE YOU HAD ANY THOUGHTS OF HARMING ANYONE ELSE?: NO

## 2024-09-28 SDOH — HEALTH STABILITY: MENTAL HEALTH

## 2024-09-28 SDOH — SOCIAL STABILITY: SOCIAL NETWORK: HOW OFTEN DO YOU ATTEND CHURCH OR RELIGIOUS SERVICES?: PATIENT DECLINED

## 2024-09-28 SDOH — HEALTH STABILITY: MENTAL HEALTH: HOW OFTEN DO YOU HAVE 6 OR MORE DRINKS ON ONE OCCASION?: NEVER

## 2024-09-28 SDOH — HEALTH STABILITY: MENTAL HEALTH: BEHAVIORS/MOOD: CALM;COOPERATIVE;SAD

## 2024-09-28 SDOH — HEALTH STABILITY: MENTAL HEALTH: BEHAVIORAL HEALTH(WDL): EXCEPTIONS TO WDL

## 2024-09-28 SDOH — ECONOMIC STABILITY: FOOD INSECURITY: WITHIN THE PAST 12 MONTHS, YOU WORRIED THAT YOUR FOOD WOULD RUN OUT BEFORE YOU GOT MONEY TO BUY MORE.: PATIENT DECLINED

## 2024-09-28 SDOH — ECONOMIC STABILITY: INCOME INSECURITY
IN THE PAST 12 MONTHS, HAS THE ELECTRIC, GAS, OIL, OR WATER COMPANY THREATENED TO SHUT OFF SERVICE IN YOUR HOME?: PATIENT DECLINED

## 2024-09-28 SDOH — SOCIAL STABILITY: SOCIAL INSECURITY: ARE YOU OR HAVE YOU BEEN THREATENED OR ABUSED PHYSICALLY, EMOTIONALLY, OR SEXUALLY BY ANYONE?: NO

## 2024-09-28 SDOH — HEALTH STABILITY: MENTAL HEALTH: CONTENT: UNREMARKABLE

## 2024-09-28 SDOH — SOCIAL STABILITY: SOCIAL NETWORK: EMOTIONAL SUPPORT GIVEN: REASSURE

## 2024-09-28 SDOH — HEALTH STABILITY: MENTAL HEALTH: SLEEP PATTERN: NAPS DURING THE DAY

## 2024-09-28 SDOH — HEALTH STABILITY: MENTAL HEALTH: NEEDS EXPRESSED: DENIES

## 2024-09-28 SDOH — HEALTH STABILITY: MENTAL HEALTH: HOW MANY STANDARD DRINKS CONTAINING ALCOHOL DO YOU HAVE ON A TYPICAL DAY?: PATIENT DOES NOT DRINK

## 2024-09-28 SDOH — SOCIAL STABILITY: SOCIAL INSECURITY
WITHIN THE LAST YEAR, HAVE YOU BEEN HUMILIATED OR EMOTIONALLY ABUSED IN OTHER WAYS BY YOUR PARTNER OR EX-PARTNER?: PATIENT DECLINED

## 2024-09-28 SDOH — HEALTH STABILITY: MENTAL HEALTH
STRESS IS WHEN SOMEONE FEELS TENSE, NERVOUS, ANXIOUS, OR CAN'T SLEEP AT NIGHT BECAUSE THEIR MIND IS TROUBLED. HOW STRESSED ARE YOU?: PATIENT DECLINED

## 2024-09-28 SDOH — ECONOMIC STABILITY: TRANSPORTATION INSECURITY
IN THE PAST 12 MONTHS, HAS LACK OF TRANSPORTATION KEPT YOU FROM MEETINGS, WORK, OR FROM GETTING THINGS NEEDED FOR DAILY LIVING?: PATIENT DECLINED

## 2024-09-28 SDOH — ECONOMIC STABILITY: HOUSING INSECURITY: IN THE PAST 12 MONTHS, HOW MANY TIMES HAVE YOU MOVED WHERE YOU WERE LIVING?: 1

## 2024-09-28 SDOH — SOCIAL STABILITY: SOCIAL INSECURITY: ARE THERE ANY APPARENT SIGNS OF INJURIES/BEHAVIORS THAT COULD BE RELATED TO ABUSE/NEGLECT?: NO

## 2024-09-28 SDOH — SOCIAL STABILITY: SOCIAL NETWORK
DO YOU BELONG TO ANY CLUBS OR ORGANIZATIONS SUCH AS CHURCH GROUPS UNIONS, FRATERNAL OR ATHLETIC GROUPS, OR SCHOOL GROUPS?: PATIENT DECLINED

## 2024-09-28 SDOH — ECONOMIC STABILITY: TRANSPORTATION INSECURITY
IN THE PAST 12 MONTHS, HAS THE LACK OF TRANSPORTATION KEPT YOU FROM MEDICAL APPOINTMENTS OR FROM GETTING MEDICATIONS?: PATIENT DECLINED

## 2024-09-28 SDOH — SOCIAL STABILITY: SOCIAL INSECURITY: HAS ANYONE EVER THREATENED TO HURT YOUR FAMILY OR YOUR PETS?: NO

## 2024-09-28 SDOH — HEALTH STABILITY: MENTAL HEALTH: BEHAVIORS/MOOD: COOPERATIVE;CALM

## 2024-09-28 SDOH — HEALTH STABILITY: MENTAL HEALTH
OTHER SUICIDE PRECAUTIONS INCLUDE: PATIENT PLACED IN AN EASILY OBSERVABLE ROOM WITH DOOR/CURTAIN REMAINING OPEN;PATIENT PLACED IN GOWN (SNAPS OR PAPER GOWNS PREFERRED) AND WANDED;REMAINING RISKS IDENTIFIED AND MITIGATED;HOURLY BEHAVIORAL ASSESSMENT PERFORMED;PERSONAL BELONGINGS SECURED

## 2024-09-28 SDOH — SOCIAL STABILITY: SOCIAL INSECURITY: DOES ANYONE TRY TO KEEP YOU FROM HAVING/CONTACTING OTHER FRIENDS OR DOING THINGS OUTSIDE YOUR HOME?: NO

## 2024-09-28 SDOH — SOCIAL STABILITY: SOCIAL INSECURITY
WITHIN THE LAST YEAR, HAVE TO BEEN RAPED OR FORCED TO HAVE ANY KIND OF SEXUAL ACTIVITY BY YOUR PARTNER OR EX-PARTNER?: PATIENT DECLINED

## 2024-09-28 SDOH — SOCIAL STABILITY: SOCIAL INSECURITY: WERE YOU ABLE TO COMPLETE ALL THE BEHAVIORAL HEALTH SCREENINGS?: YES

## 2024-09-28 SDOH — SOCIAL STABILITY: SOCIAL NETWORK: IN A TYPICAL WEEK, HOW MANY TIMES DO YOU TALK ON THE PHONE WITH FAMILY, FRIENDS, OR NEIGHBORS?: PATIENT DECLINED

## 2024-09-28 SDOH — ECONOMIC STABILITY: INCOME INSECURITY: IN THE LAST 12 MONTHS, WAS THERE A TIME WHEN YOU WERE NOT ABLE TO PAY THE MORTGAGE OR RENT ON TIME?: PATIENT DECLINED

## 2024-09-28 SDOH — ECONOMIC STABILITY: FOOD INSECURITY: WITHIN THE PAST 12 MONTHS, THE FOOD YOU BOUGHT JUST DIDN'T LAST AND YOU DIDN'T HAVE MONEY TO GET MORE.: PATIENT DECLINED

## 2024-09-28 SDOH — SOCIAL STABILITY: SOCIAL NETWORK: HOW OFTEN DO YOU GET TOGETHER WITH FRIENDS OR RELATIVES?: PATIENT DECLINED

## 2024-09-28 SDOH — HEALTH STABILITY: MENTAL HEALTH: SLEEP PATTERN: SLEEPS ALL NIGHT

## 2024-09-28 SDOH — SOCIAL STABILITY: SOCIAL INSECURITY: DO YOU FEEL ANYONE HAS EXPLOITED OR TAKEN ADVANTAGE OF YOU FINANCIALLY OR OF YOUR PERSONAL PROPERTY?: NO

## 2024-09-28 SDOH — SOCIAL STABILITY: SOCIAL INSECURITY: DO YOU FEEL UNSAFE GOING BACK TO THE PLACE WHERE YOU ARE LIVING?: NO

## 2024-09-28 SDOH — ECONOMIC STABILITY: HOUSING INSECURITY: AT ANY TIME IN THE PAST 12 MONTHS, WERE YOU HOMELESS OR LIVING IN A SHELTER (INCLUDING NOW)?: PATIENT DECLINED

## 2024-09-28 SDOH — SOCIAL STABILITY: SOCIAL INSECURITY: ABUSE: ADULT

## 2024-09-28 SDOH — SOCIAL STABILITY: SOCIAL INSECURITY
WITHIN THE LAST YEAR, HAVE YOU BEEN KICKED, HIT, SLAPPED, OR OTHERWISE PHYSICALLY HURT BY YOUR PARTNER OR EX-PARTNER?: PATIENT DECLINED

## 2024-09-28 SDOH — SOCIAL STABILITY: SOCIAL NETWORK: HOW OFTEN DO YOU ATTENT MEETINGS OF THE CLUB OR ORGANIZATION YOU BELONG TO?: PATIENT DECLINED

## 2024-09-28 SDOH — SOCIAL STABILITY: SOCIAL NETWORK: ARE YOU MARRIED, WIDOWED, DIVORCED, SEPARATED, NEVER MARRIED, OR LIVING WITH A PARTNER?: PATIENT DECLINED

## 2024-09-28 SDOH — SOCIAL STABILITY: SOCIAL INSECURITY: WITHIN THE LAST YEAR, HAVE YOU BEEN AFRAID OF YOUR PARTNER OR EX-PARTNER?: PATIENT DECLINED

## 2024-09-28 SDOH — SOCIAL STABILITY: SOCIAL INSECURITY: HAVE YOU HAD THOUGHTS OF HARMING ANYONE ELSE?: NO

## 2024-09-28 SDOH — SOCIAL STABILITY: SOCIAL NETWORK: PARENT/GUARDIAN/SIGNIFICANT OTHER INVOLVEMENT: NO INVOLVEMENT

## 2024-09-28 SDOH — ECONOMIC STABILITY: INCOME INSECURITY: HOW HARD IS IT FOR YOU TO PAY FOR THE VERY BASICS LIKE FOOD, HOUSING, MEDICAL CARE, AND HEATING?: PATIENT DECLINED

## 2024-09-28 ASSESSMENT — LIFESTYLE VARIABLES
EVER FELT BAD OR GUILTY ABOUT YOUR DRINKING: NO
HEADACHE, FULLNESS IN HEAD: VERY MILD
PULSE: 75
SKIP TO QUESTIONS 9-10: 1
SUBSTANCE_ABUSE_PAST_12_MONTHS: YES
ANXIETY: NO ANXIETY, AT EASE
ORIENTATION AND CLOUDING OF SENSORIUM: ORIENTED AND CAN DO SERIAL ADDITIONS
VISUAL DISTURBANCES: NOT PRESENT
HAVE YOU EVER FELT YOU SHOULD CUT DOWN ON YOUR DRINKING: NO
HEADACHE, FULLNESS IN HEAD: NOT PRESENT
VISUAL DISTURBANCES: VERY MILD SENSITIVITY
AUDITORY DISTURBANCES: VERY MILD HARSHNESS OR ABILITY TO FRIGHTEN
PAROXYSMAL SWEATS: 2
AUDIT-C TOTAL SCORE: 0
TOTAL SCORE: 8
TREMOR: NOT VISIBLE, BUT CAN BE FELT FINGERTIP TO FINGERTIP
TOTAL_SCORE: 7
ORIENTATION AND CLOUDING OF SENSORIUM: ORIENTED AND CAN DO SERIAL ADDITIONS
EVER HAD A DRINK FIRST THING IN THE MORNING TO STEADY YOUR NERVES TO GET RID OF A HANGOVER: NO
TOTAL_SCORE: 8
PULSE: 104
TOTAL SCORE: 0
TOTAL SCORE: 5
NAUSEA AND VOMITING: MILD NAUSEA WITH NO VOMITING
NAUSEA AND VOMITING: NO NAUSEA AND NO VOMITING
TACTILE DISTURBANCES: VERY MILD ITCHING, PINS AND NEEDLES, BURNING OR NUMBNESS
TOTAL_SCORE: 9
HOW MANY STANDARD DRINKS CONTAINING ALCOHOL DO YOU HAVE ON A TYPICAL DAY: PATIENT DOES NOT DRINK
AGITATION: NORMAL ACTIVITY
TOTAL_SCORE: 6
PAROXYSMAL SWEATS: 2
HOW OFTEN DO YOU HAVE A DRINK CONTAINING ALCOHOL: NEVER
ORIENTATION AND CLOUDING OF SENSORIUM: ORIENTED AND CAN DO SERIAL ADDITIONS
AUDIT-C TOTAL SCORE: 0
AUDIT-C TOTAL SCORE: 0
NAUSEA AND VOMITING: MILD NAUSEA WITH NO VOMITING
AGITATION: NORMAL ACTIVITY
VISUAL DISTURBANCES: NOT PRESENT
HAVE PEOPLE ANNOYED YOU BY CRITICIZING YOUR DRINKING: NO
VISUAL DISTURBANCES: NOT PRESENT
ANXIETY: MILDLY ANXIOUS
AUDITORY DISTURBANCES: NOT PRESENT
ANXIETY: MILDLY ANXIOUS
TOTAL_SCORE: 0
HOW OFTEN DO YOU HAVE 6 OR MORE DRINKS ON ONE OCCASION: NEVER
TOTAL SCORE: 5
BLOOD PRESSURE: 124/63
SKIP TO QUESTIONS 9-10: 1
TREMOR: 2
TREMOR: 2
AUDITORY DISTURBANCES: NOT PRESENT
PAROXYSMAL SWEATS: 2
PAROXYSMAL SWEATS: BARELY PERCEPTIBLE SWEATING, PALMS MOIST
AUDITORY DISTURBANCES: NOT PRESENT
ANXIETY: NO ANXIETY, AT EASE
HEADACHE, FULLNESS IN HEAD: NOT PRESENT
BLOOD PRESSURE: 115/72
TREMOR: NOT VISIBLE, BUT CAN BE FELT FINGERTIP TO FINGERTIP
PRESCIPTION_ABUSE_PAST_12_MONTHS: YES
ORIENTATION AND CLOUDING OF SENSORIUM: ORIENTED AND CAN DO SERIAL ADDITIONS
TOTAL SCORE: 3
AGITATION: NORMAL ACTIVITY
NAUSEA AND VOMITING: NO NAUSEA AND NO VOMITING
AGITATION: NORMAL ACTIVITY
HEADACHE, FULLNESS IN HEAD: NOT PRESENT

## 2024-09-28 ASSESSMENT — ACTIVITIES OF DAILY LIVING (ADL)
GROOMING: INDEPENDENT
HEARING - RIGHT EAR: FUNCTIONAL
LACK_OF_TRANSPORTATION: PATIENT DECLINED
JUDGMENT_ADEQUATE_SAFELY_COMPLETE_DAILY_ACTIVITIES: NO
DRESSING YOURSELF: INDEPENDENT
ADEQUATE_TO_COMPLETE_ADL: YES
FEEDING YOURSELF: INDEPENDENT
WALKS IN HOME: INDEPENDENT
BATHING: INDEPENDENT
PATIENT'S MEMORY ADEQUATE TO SAFELY COMPLETE DAILY ACTIVITIES?: YES
HEARING - LEFT EAR: FUNCTIONAL
TOILETING: INDEPENDENT

## 2024-09-28 ASSESSMENT — ENCOUNTER SYMPTOMS
RECTAL PAIN: 1
DIZZINESS: 1
HEADACHES: 1
HEMATURIA: 1

## 2024-09-28 ASSESSMENT — COLUMBIA-SUICIDE SEVERITY RATING SCALE - C-SSRS
2. HAVE YOU ACTUALLY HAD ANY THOUGHTS OF KILLING YOURSELF?: YES
5. HAVE YOU STARTED TO WORK OUT OR WORKED OUT THE DETAILS OF HOW TO KILL YOURSELF? DO YOU INTEND TO CARRY OUT THIS PLAN?: NO
2. HAVE YOU ACTUALLY HAD ANY THOUGHTS OF KILLING YOURSELF?: NO
2. HAVE YOU ACTUALLY HAD ANY THOUGHTS OF KILLING YOURSELF?: YES
6. HAVE YOU EVER DONE ANYTHING, STARTED TO DO ANYTHING, OR PREPARED TO DO ANYTHING TO END YOUR LIFE?: YES
6. HAVE YOU EVER DONE ANYTHING, STARTED TO DO ANYTHING, OR PREPARED TO DO ANYTHING TO END YOUR LIFE?: NO
4. HAVE YOU HAD THESE THOUGHTS AND HAD SOME INTENTION OF ACTING ON THEM?: NO
6. HAVE YOU EVER DONE ANYTHING, STARTED TO DO ANYTHING, OR PREPARED TO DO ANYTHING TO END YOUR LIFE?: NO
2. HAVE YOU ACTUALLY HAD ANY THOUGHTS OF KILLING YOURSELF?: NO
1. IN THE PAST MONTH, HAVE YOU WISHED YOU WERE DEAD OR WISHED YOU COULD GO TO SLEEP AND NOT WAKE UP?: YES
6. HAVE YOU EVER DONE ANYTHING, STARTED TO DO ANYTHING, OR PREPARED TO DO ANYTHING TO END YOUR LIFE?: NO
1. SINCE LAST CONTACT, HAVE YOU WISHED YOU WERE DEAD OR WISHED YOU COULD GO TO SLEEP AND NOT WAKE UP?: NO
5. HAVE YOU STARTED TO WORK OUT OR WORKED OUT THE DETAILS OF HOW TO KILL YOURSELF? DO YOU INTEND TO CARRY OUT THIS PLAN?: NO
5. HAVE YOU STARTED TO WORK OUT OR WORKED OUT THE DETAILS OF HOW TO KILL YOURSELF? DO YOU INTEND TO CARRY OUT THIS PLAN?: NO
6. HAVE YOU EVER DONE ANYTHING, STARTED TO DO ANYTHING, OR PREPARED TO DO ANYTHING TO END YOUR LIFE?: NO

## 2024-09-28 ASSESSMENT — PATIENT HEALTH QUESTIONNAIRE - PHQ9
SUM OF ALL RESPONSES TO PHQ9 QUESTIONS 1 & 2: 6
2. FEELING DOWN, DEPRESSED OR HOPELESS: NEARLY EVERY DAY
1. LITTLE INTEREST OR PLEASURE IN DOING THINGS: NEARLY EVERY DAY

## 2024-09-28 ASSESSMENT — COGNITIVE AND FUNCTIONAL STATUS - GENERAL
DAILY ACTIVITIY SCORE: 24
PATIENT BASELINE BEDBOUND: NO
MOBILITY SCORE: 24

## 2024-09-28 ASSESSMENT — PAIN DESCRIPTION - LOCATION
LOCATION: GENERALIZED
LOCATION: GENERALIZED

## 2024-09-28 ASSESSMENT — PAIN - FUNCTIONAL ASSESSMENT
PAIN_FUNCTIONAL_ASSESSMENT: 0-10

## 2024-09-28 ASSESSMENT — PAIN SCALES - GENERAL
PAINLEVEL_OUTOF10: 2
PAINLEVEL_OUTOF10: 0 - NO PAIN
PAINLEVEL_OUTOF10: 4
PAINLEVEL_OUTOF10: 0 - NO PAIN
PAINLEVEL_OUTOF10: 5 - MODERATE PAIN
PAINLEVEL_OUTOF10: 2
PAINLEVEL_OUTOF10: 6
PAINLEVEL_OUTOF10: 2

## 2024-09-28 ASSESSMENT — PAIN SCALES - PAIN ASSESSMENT IN ADVANCED DEMENTIA (PAINAD): TOTALSCORE: MEDICATION (SEE MAR)
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
## 2024-09-28 NOTE — ED NOTES
Patient states she is usi\ng her friends prescription for xanax. Her last use was yesterday. She states she also smokes heroin. She last used yesterday morning. She has been to detox once. She states her mother is very sick and she cannot go at this time.      Sirisha Kirk RN  09/28/24 9186

## 2024-09-28 NOTE — NURSING NOTE
Patient admitted to [facility]. Patient stated she last used xanax 2 days ago and typically uses 2 bars per day. Patient stated she smokes heroin, about 1/2 gram a day everyday and last used yesterday. Patient did express suicidal thoughts upon arrival with no plan. Suicide precautions in place and observer at bedside. Patient belongings removed from room. Patient tearful stating her mother was given 3 months to live and is not handling it well. Patient calm, cooperative and pleasant. Patient did score 9 and 8 on COWS and medicated with PRN medications to help with symptom management. Spoke with Dr. Roe to clarify Subutex ordered and he said to only give it as a last resort if she has no relief from all other PRN medications. Patient understood this as well and did not want the Subutex now either. IVF running.
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
## 2024-09-29 VITALS
BODY MASS INDEX: 18.48 KG/M2 | HEART RATE: 67 BPM | HEIGHT: 66 IN | SYSTOLIC BLOOD PRESSURE: 107 MMHG | OXYGEN SATURATION: 100 % | WEIGHT: 115 LBS | RESPIRATION RATE: 20 BRPM | DIASTOLIC BLOOD PRESSURE: 71 MMHG | TEMPERATURE: 97.2 F

## 2024-09-29 LAB
ALBUMIN SERPL BCP-MCNC: 3.4 G/DL (ref 3.4–5)
ALP SERPL-CCNC: 67 U/L (ref 33–110)
ALT SERPL W P-5'-P-CCNC: 45 U/L (ref 7–45)
ANION GAP SERPL CALC-SCNC: 10 MMOL/L (ref 10–20)
AST SERPL W P-5'-P-CCNC: 30 U/L (ref 9–39)
BILIRUB SERPL-MCNC: 0.5 MG/DL (ref 0–1.2)
BUN SERPL-MCNC: 15 MG/DL (ref 6–23)
CALCIUM SERPL-MCNC: 8.4 MG/DL (ref 8.6–10.3)
CHLORIDE SERPL-SCNC: 110 MMOL/L (ref 98–107)
CO2 SERPL-SCNC: 24 MMOL/L (ref 21–32)
CREAT SERPL-MCNC: 0.6 MG/DL (ref 0.5–1.05)
EGFRCR SERPLBLD CKD-EPI 2021: >90 ML/MIN/1.73M*2
ERYTHROCYTE [DISTWIDTH] IN BLOOD BY AUTOMATED COUNT: 13.2 % (ref 11.5–14.5)
GLUCOSE SERPL-MCNC: 111 MG/DL (ref 74–99)
HCT VFR BLD AUTO: 30.2 % (ref 36–46)
HGB BLD-MCNC: 9.7 G/DL (ref 12–16)
MAGNESIUM SERPL-MCNC: 1.89 MG/DL (ref 1.6–2.4)
MCH RBC QN AUTO: 30.1 PG (ref 26–34)
MCHC RBC AUTO-ENTMCNC: 32.1 G/DL (ref 32–36)
MCV RBC AUTO: 94 FL (ref 80–100)
NRBC BLD-RTO: 0 /100 WBCS (ref 0–0)
PHOSPHATE SERPL-MCNC: 3.4 MG/DL (ref 2.5–4.9)
PLATELET # BLD AUTO: 146 X10*3/UL (ref 150–450)
POTASSIUM SERPL-SCNC: 3.8 MMOL/L (ref 3.5–5.3)
PROT SERPL-MCNC: 5.8 G/DL (ref 6.4–8.2)
RBC # BLD AUTO: 3.22 X10*6/UL (ref 4–5.2)
SODIUM SERPL-SCNC: 140 MMOL/L (ref 136–145)
WBC # BLD AUTO: 3.6 X10*3/UL (ref 4.4–11.3)

## 2024-09-29 PROCEDURE — 84100 ASSAY OF PHOSPHORUS: CPT | Performed by: NURSE PRACTITIONER

## 2024-09-29 PROCEDURE — 1200000002 HC GENERAL ROOM WITH TELEMETRY DAILY

## 2024-09-29 PROCEDURE — 99254 IP/OBS CNSLTJ NEW/EST MOD 60: CPT | Performed by: PSYCHIATRY & NEUROLOGY

## 2024-09-29 PROCEDURE — 84075 ASSAY ALKALINE PHOSPHATASE: CPT | Performed by: NURSE PRACTITIONER

## 2024-09-29 PROCEDURE — 2500000001 HC RX 250 WO HCPCS SELF ADMINISTERED DRUGS (ALT 637 FOR MEDICARE OP): Performed by: NURSE PRACTITIONER

## 2024-09-29 PROCEDURE — 2500000002 HC RX 250 W HCPCS SELF ADMINISTERED DRUGS (ALT 637 FOR MEDICARE OP, ALT 636 FOR OP/ED): Performed by: NURSE PRACTITIONER

## 2024-09-29 PROCEDURE — 36415 COLL VENOUS BLD VENIPUNCTURE: CPT | Performed by: NURSE PRACTITIONER

## 2024-09-29 PROCEDURE — 2500000001 HC RX 250 WO HCPCS SELF ADMINISTERED DRUGS (ALT 637 FOR MEDICARE OP)

## 2024-09-29 PROCEDURE — 2500000001 HC RX 250 WO HCPCS SELF ADMINISTERED DRUGS (ALT 637 FOR MEDICARE OP): Performed by: PSYCHIATRY & NEUROLOGY

## 2024-09-29 PROCEDURE — 2500000004 HC RX 250 GENERAL PHARMACY W/ HCPCS (ALT 636 FOR OP/ED): Performed by: NURSE PRACTITIONER

## 2024-09-29 PROCEDURE — 83735 ASSAY OF MAGNESIUM: CPT | Performed by: NURSE PRACTITIONER

## 2024-09-29 PROCEDURE — 85027 COMPLETE CBC AUTOMATED: CPT | Performed by: NURSE PRACTITIONER

## 2024-09-29 RX ORDER — LORAZEPAM 0.5 MG/1
0.5 TABLET ORAL EVERY 2 HOUR PRN
Status: DISCONTINUED | OUTPATIENT
Start: 2024-09-29 | End: 2024-09-30 | Stop reason: HOSPADM

## 2024-09-29 RX ORDER — LORAZEPAM 1 MG/1
1 TABLET ORAL EVERY 2 HOUR PRN
Status: DISCONTINUED | OUTPATIENT
Start: 2024-09-29 | End: 2024-09-30 | Stop reason: HOSPADM

## 2024-09-29 RX ORDER — LORAZEPAM 1 MG/1
2 TABLET ORAL EVERY 2 HOUR PRN
Status: DISCONTINUED | OUTPATIENT
Start: 2024-09-29 | End: 2024-09-30 | Stop reason: HOSPADM

## 2024-09-29 ASSESSMENT — LIFESTYLE VARIABLES
TOTAL SCORE: 16
HEADACHE, FULLNESS IN HEAD: NOT PRESENT
NAUSEA AND VOMITING: NO NAUSEA AND NO VOMITING
NAUSEA AND VOMITING: MILD NAUSEA WITH NO VOMITING
TOTAL SCORE: 0
ANXIETY: 6
ANXIETY: NO ANXIETY, AT EASE
PULSE: 65
AGITATION: NORMAL ACTIVITY
AUDITORY DISTURBANCES: NOT PRESENT
AUDITORY DISTURBANCES: NOT PRESENT
VISUAL DISTURBANCES: NOT PRESENT
TOTAL SCORE: 0
AGITATION: SOMEWHAT MORE THAN NORMAL ACTIVITY
PAROXYSMAL SWEATS: 3
AUDITORY DISTURBANCES: NOT PRESENT
ORIENTATION AND CLOUDING OF SENSORIUM: ORIENTED AND CAN DO SERIAL ADDITIONS
ORIENTATION AND CLOUDING OF SENSORIUM: ORIENTED AND CAN DO SERIAL ADDITIONS
VISUAL DISTURBANCES: NOT PRESENT
PULSE: 91
HEADACHE, FULLNESS IN HEAD: NOT PRESENT
PAROXYSMAL SWEATS: NO SWEAT VISIBLE
ORIENTATION AND CLOUDING OF SENSORIUM: ORIENTED AND CAN DO SERIAL ADDITIONS
AUDITORY DISTURBANCES: NOT PRESENT
PAROXYSMAL SWEATS: 3
PAROXYSMAL SWEATS: NO SWEAT VISIBLE
AGITATION: NORMAL ACTIVITY
TOTAL_SCORE: 3
TREMOR: NOT VISIBLE, BUT CAN BE FELT FINGERTIP TO FINGERTIP
AGITATION: MODERATELY FIDGETY AND RESTLESS
TREMOR: NO TREMOR
TOTAL_SCORE: 0
NAUSEA AND VOMITING: NO NAUSEA AND NO VOMITING
ANXIETY: 6
BLOOD PRESSURE: 115/77
NAUSEA AND VOMITING: MILD NAUSEA WITH NO VOMITING
TOTAL_SCORE: 12
NAUSEA AND VOMITING: NO NAUSEA AND NO VOMITING
VISUAL DISTURBANCES: NOT PRESENT
AUDITORY DISTURBANCES: NOT PRESENT
TOTAL_SCORE: 9
ORIENTATION AND CLOUDING OF SENSORIUM: ORIENTED AND CAN DO SERIAL ADDITIONS
BLOOD PRESSURE: 90/50
ORIENTATION AND CLOUDING OF SENSORIUM: ORIENTED AND CAN DO SERIAL ADDITIONS
TREMOR: NO TREMOR
TOTAL_SCORE: 4
TOTAL SCORE: 0
TOTAL SCORE: 13
HEADACHE, FULLNESS IN HEAD: NOT PRESENT
PAROXYSMAL SWEATS: NO SWEAT VISIBLE
ANXIETY: NO ANXIETY, AT EASE
AGITATION: NORMAL ACTIVITY
TREMOR: NO TREMOR
VISUAL DISTURBANCES: NOT PRESENT
HEADACHE, FULLNESS IN HEAD: NOT PRESENT
TREMOR: 2
TOTAL_SCORE: 2
VISUAL DISTURBANCES: NOT PRESENT
HEADACHE, FULLNESS IN HEAD: VERY MILD
ANXIETY: NO ANXIETY, AT EASE

## 2024-09-29 ASSESSMENT — PAIN SCALES - GENERAL
PAINLEVEL_OUTOF10: 6
PAINLEVEL_OUTOF10: 1
PAINLEVEL_OUTOF10: 4
PAINLEVEL_OUTOF10: 5 - MODERATE PAIN

## 2024-09-29 ASSESSMENT — COGNITIVE AND FUNCTIONAL STATUS - GENERAL
MOBILITY SCORE: 24
DAILY ACTIVITIY SCORE: 24

## 2024-09-29 ASSESSMENT — COLUMBIA-SUICIDE SEVERITY RATING SCALE - C-SSRS
5. HAVE YOU STARTED TO WORK OUT OR WORKED OUT THE DETAILS OF HOW TO KILL YOURSELF? DO YOU INTEND TO CARRY OUT THIS PLAN?: NO
1. SINCE LAST CONTACT, HAVE YOU WISHED YOU WERE DEAD OR WISHED YOU COULD GO TO SLEEP AND NOT WAKE UP?: NO
6. HAVE YOU EVER DONE ANYTHING, STARTED TO DO ANYTHING, OR PREPARED TO DO ANYTHING TO END YOUR LIFE?: NO
6. HAVE YOU EVER DONE ANYTHING, STARTED TO DO ANYTHING, OR PREPARED TO DO ANYTHING TO END YOUR LIFE?: NO
2. HAVE YOU ACTUALLY HAD ANY THOUGHTS OF KILLING YOURSELF?: NO
2. HAVE YOU ACTUALLY HAD ANY THOUGHTS OF KILLING YOURSELF?: NO
5. HAVE YOU STARTED TO WORK OUT OR WORKED OUT THE DETAILS OF HOW TO KILL YOURSELF? DO YOU INTEND TO CARRY OUT THIS PLAN?: NO
1. SINCE LAST CONTACT, HAVE YOU WISHED YOU WERE DEAD OR WISHED YOU COULD GO TO SLEEP AND NOT WAKE UP?: NO

## 2024-09-29 ASSESSMENT — PAIN - FUNCTIONAL ASSESSMENT
PAIN_FUNCTIONAL_ASSESSMENT: 0-10
PAIN_FUNCTIONAL_ASSESSMENT: 0-10

## 2024-09-29 ASSESSMENT — PAIN SCALES - PAIN ASSESSMENT IN ADVANCED DEMENTIA (PAINAD): TOTALSCORE: MEDICATION (SEE MAR)

## 2024-09-29 NOTE — PROGRESS NOTES
Judith Fierro is a 29 y.o. female on day 1 of admission presenting with Withdrawal from benzodiazepine, uncomplicated (Multi). Pt was evaluated by Psych and needs to [facility] in psych facility.    Subjective   No cp/SOB       Objective         Current Facility-Administered Medications:     acetaminophen (Tylenol) tablet 650 mg, 650 mg, oral, q6h PRN, Bess Brothers, APRN-CNP, 650 mg at 09/29/24 1031    acetaminophen (Tylenol) tablet 650 mg, 650 mg, oral, q4h PRN **OR** [DISCONTINUED] acetaminophen (Tylenol) oral liquid 650 mg, 650 mg, nasogastric tube, q4h PRN **OR** [DISCONTINUED] acetaminophen (Tylenol) suppository 650 mg, 650 mg, rectal, q4h PRN, Brianna Mckinney, APRN-CNP    buprenorphine (Subutex) SL tablet 2 mg, 2 mg, sublingual, q4h PRN, Bess Brothers, APRN-CNP    cloNIDine (Catapres) tablet 0.1 mg, 0.1 mg, oral, q4h PRN, Bess Brothers, APRN-CNP, 0.1 mg at 09/29/24 1031    dicyclomine (Bentyl) capsule 10 mg, 10 mg, oral, q6h PRN, Bess Brothers, APRN-CNP, 10 mg at 09/29/24 1031    folic acid (Folvite) tablet 1 mg, 1 mg, oral, Daily, Kristin Johnston, APRN-CNP, 1 mg at 09/29/24 1021    hydrOXYzine HCL (Atarax) tablet 25 mg, 25 mg, oral, q4h PRN, Bess Brothers, APRN-CNP, 25 mg at 09/29/24 1031    ibuprofen tablet 400 mg, 400 mg, oral, q8h PRN, Bess Brothers, APRN-CNP, 400 mg at 09/29/24 1224    LORazepam (Ativan) tablet 0.5 mg, 0.5 mg, oral, q2h PRN **OR** LORazepam (Ativan) tablet 1 mg, 1 mg, oral, q2h PRN **OR** LORazepam (Ativan) tablet 2 mg, 2 mg, oral, q2h PRN, Rafal Roe MD, 2 mg at 09/29/24 1224    multivitamin with minerals 1 tablet, 1 tablet, oral, Daily, Perry Lundy DO, 1 tablet at 09/29/24 1021    ondansetron (Zofran) tablet 4 mg, 4 mg, oral, q8h PRN, 4 mg at 09/28/24 0841 **OR** ondansetron (Zofran) injection 4 mg, 4 mg, intravenous, q8h PRN, Kristin Johnston, APRN-CNP, 4 mg at 09/29/24 1224    QUEtiapine (SEROquel) tablet 25 mg, 25 mg, oral, q12h, RENÉ Bosch, 25 mg  "at 09/29/24 1224    traZODone (Desyrel) tablet 100 mg, 100 mg, oral, Nightly PRN, Bess Brothers, APRN-CNP, 100 mg at 09/28/24 2046       Physical Exam  HENT:      Head: Normocephalic.   Eyes:      Conjunctiva/sclera: Conjunctivae normal.   Cardiovascular:      Rate and Rhythm: Regular rhythm.   Pulmonary:      Breath sounds: Normal breath sounds.   Abdominal:      General: Bowel sounds are normal.      Palpations: Abdomen is soft.   Musculoskeletal:         General: Normal range of motion.   Skin:     General: Skin is warm and dry.   Neurological:      General: No focal deficit present.      Mental Status: She is alert.   Psychiatric:         Behavior: Behavior normal.           Last Recorded Vitals  Blood pressure 115/77, pulse 91, temperature 36.2 °C (97.2 °F), temperature source Temporal, resp. rate 16, height 1.676 m (5' 5.98\"), weight 52.2 kg (115 lb), last menstrual period 09/26/2024, SpO2 99%.  Intake/Output last 3 Shifts:  I/O last 3 completed shifts:  In: 415 (8 mL/kg) [I.V.:415 (8 mL/kg)]  Out: - (0 mL/kg)   Weight: 52.2 kg     Labs:       Results for orders placed or performed during the hospital encounter of 09/28/24 (from the past 24 hour(s))   CBC   Result Value Ref Range    WBC 3.6 (L) 4.4 - 11.3 x10*3/uL    nRBC 0.0 0.0 - 0.0 /100 WBCs    RBC 3.22 (L) 4.00 - 5.20 x10*6/uL    Hemoglobin 9.7 (L) 12.0 - 16.0 g/dL    Hematocrit 30.2 (L) 36.0 - 46.0 %    MCV 94 80 - 100 fL    MCH 30.1 26.0 - 34.0 pg    MCHC 32.1 32.0 - 36.0 g/dL    RDW 13.2 11.5 - 14.5 %    Platelets 146 (L) 150 - 450 x10*3/uL   Comprehensive metabolic panel   Result Value Ref Range    Glucose 111 (H) 74 - 99 mg/dL    Sodium 140 136 - 145 mmol/L    Potassium 3.8 3.5 - 5.3 mmol/L    Chloride 110 (H) 98 - 107 mmol/L    Bicarbonate 24 21 - 32 mmol/L    Anion Gap 10 10 - 20 mmol/L    Urea Nitrogen 15 6 - 23 mg/dL    Creatinine 0.60 0.50 - 1.05 mg/dL    eGFR >90 >60 mL/min/1.73m*2    Calcium 8.4 (L) 8.6 - 10.3 mg/dL    Albumin 3.4 3.4 - 5.0 " g/dL    Alkaline Phosphatase 67 33 - 110 U/L    Total Protein 5.8 (L) 6.4 - 8.2 g/dL    AST 30 9 - 39 U/L    Bilirubin, Total 0.5 0.0 - 1.2 mg/dL    ALT 45 7 - 45 U/L   Magnesium   Result Value Ref Range    Magnesium 1.89 1.60 - 2.40 mg/dL   Phosphorus   Result Value Ref Range    Phosphorus 3.4 2.5 - 4.9 mg/dL              Assessment/Plan   Principal Problem:    Withdrawal from benzodiazepine, uncomplicated (Multi)  Active Problems:    Suicidal ideation    Heroin withdrawal (Multi)    Methamphetamine dependence (Multi)        PLAN: Pt was evaluated by Psych, currently on CIWA and CINA protocol, needs in pt psych treatment. Pt  is medically stable and clear for discharge, d/w CNP. [facility]               Jovon Lewis MD
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
## 2024-09-29 NOTE — CARE PLAN
Problem: Sensory Perceptual Alteration as Evidenced by  Goal: Cooperates with admission process  Outcome: Progressing

## 2024-09-29 NOTE — CONSULTS
Inpatient consult to Psychiatry  Consult performed by: Rafal Roe MD  Consult ordered by: Kristin Johnston, APRN-CNP  Reason for consult: Benzo withdrawal and SI  Assessment/Recommendations: Bipolar depression  Benzo and Opiate dependence in withdrawal    Plan:    1. CIWA and CINA protocol as ordered  2. Pt is at high risk of impulsivity and suicide  Will need admission to IP psych unit  3. Cannot sign AMA  4. EPAT referral when medically stable  5. Pt agreeable to the plan  6. Maintain sitter      Reason for consult:  Depression SI, addiction    History Of Present Illness  Judith Fierro is a 29 y.o. female presenting with addiction and depression.  Pt live with mom and unemployed  Has been using xanax 2-3 bars daily, heroin 1/2 gm daily and Meth 1 to 1.5 gm daily for the past 13 years on and off  Has been to rehab once but not been sober for any prolonged period of time  No H/O seizures  Pt has been feeling depressed with hopeless and worthless feeling  Patient does not have any purpose in living and wants to commit suicide.  She has thought about various means to commit suicide including taking an overdose multiple times in the past.  Patient has history of accidental overdose with heroin.  Has been feeling markedly anxious.  Denies any audiovisual hallucinations or paranoid thoughts.  No OCD or PTSD symptoms.    No significant past admission to psychiatric hospital.  No intentional overdose or never been diagnosed with any illness although she suspect that she might be bipolar     Past Medical History  She has no past medical history on file.    Surgical History  She has no past surgical history on file.     Social History  She reports that she has been smoking cigarettes. She has never used smokeless tobacco. She reports current drug use. Drug: Methamphetamines. She reports that she does not drink alcohol.     Allergies  Egg          Mental Status Exam  Patient is alert and oriented x 3 cooperative  "decreased psychomotor activity.  Speech is decreased in rate and rhythm coherent.  Patient described her mood to be depressed and affect is restricted.  She does not have any formal thought disorder.  Patient has hopeless and worthless feeling with passive suicidal thoughts.  Judgment and insight is impaired.    Psychiatric Risk Assessment  High risk of impulsivity suicide  High risk of seizure      Last Recorded Vitals  Blood pressure 99/62, pulse 89, temperature 36.2 °C (97.2 °F), temperature source Temporal, resp. rate 16, height 1.676 m (5' 5.98\"), weight 52.2 kg (115 lb), last menstrual period 09/26/2024, SpO2 99%.    Relevant Results  Results for orders placed or performed during the hospital encounter of 09/28/24 (from the past 96 hour(s))   CBC and Auto Differential   Result Value Ref Range    WBC 5.2 4.4 - 11.3 x10*3/uL    nRBC 0.0 0.0 - 0.0 /100 WBCs    RBC 3.84 (L) 4.00 - 5.20 x10*6/uL    Hemoglobin 11.5 (L) 12.0 - 16.0 g/dL    Hematocrit 35.3 (L) 36.0 - 46.0 %    MCV 92 80 - 100 fL    MCH 29.9 26.0 - 34.0 pg    MCHC 32.6 32.0 - 36.0 g/dL    RDW 12.7 11.5 - 14.5 %    Platelets 161 150 - 450 x10*3/uL    Neutrophils % 70.2 40.0 - 80.0 %    Immature Granulocytes %, Automated 0.2 0.0 - 0.9 %    Lymphocytes % 22.3 13.0 - 44.0 %    Monocytes % 5.4 2.0 - 10.0 %    Eosinophils % 1.3 0.0 - 6.0 %    Basophils % 0.6 0.0 - 2.0 %    Neutrophils Absolute 3.65 1.20 - 7.70 x10*3/uL    Immature Granulocytes Absolute, Automated 0.01 0.00 - 0.70 x10*3/uL    Lymphocytes Absolute 1.16 (L) 1.20 - 4.80 x10*3/uL    Monocytes Absolute 0.28 0.10 - 1.00 x10*3/uL    Eosinophils Absolute 0.07 0.00 - 0.70 x10*3/uL    Basophils Absolute 0.03 0.00 - 0.10 x10*3/uL   Comprehensive Metabolic Panel   Result Value Ref Range    Glucose 119 (H) 74 - 99 mg/dL    Sodium 137 136 - 145 mmol/L    Potassium 4.0 3.5 - 5.3 mmol/L    Chloride 104 98 - 107 mmol/L    Bicarbonate 26 21 - 32 mmol/L    Anion Gap 11 10 - 20 mmol/L    Urea Nitrogen 21 6 - 23 " mg/dL    Creatinine 1.02 0.50 - 1.05 mg/dL    eGFR 77 >60 mL/min/1.73m*2    Calcium 9.0 8.6 - 10.3 mg/dL    Albumin 4.3 3.4 - 5.0 g/dL    Alkaline Phosphatase 90 33 - 110 U/L    Total Protein 7.3 6.4 - 8.2 g/dL    AST 36 9 - 39 U/L    Bilirubin, Total 1.2 0.0 - 1.2 mg/dL    ALT 56 (H) 7 - 45 U/L   Acute Toxicology Panel, Blood   Result Value Ref Range    Acetaminophen <10.0 10.0 - 30.0 ug/mL    Salicylate  <3 4 - 20 mg/dL    Alcohol <10 <=10 mg/dL   hCG, quantitative, pregnancy   Result Value Ref Range    HCG, Beta-Quantitative <2 <5 mIU/mL   TSH with reflex to Free T4 if abnormal   Result Value Ref Range    Thyroid Stimulating Hormone 0.25 (L) 0.44 - 3.98 mIU/L   Thyroxine, Free   Result Value Ref Range    Thyroxine, Free 1.11 0.61 - 1.12 ng/dL   Electrocardiogram, 12-lead   Result Value Ref Range    Ventricular Rate 121 BPM    Atrial Rate 121 BPM    TN Interval 150 ms    QRS Duration 88 ms    QT Interval 302 ms    QTC Calculation(Bazett) 428 ms    P Axis 67 degrees    R Axis 61 degrees    T Axis 58 degrees    QRS Count 20 beats    Q Onset 230 ms    P Onset 155 ms    P Offset 208 ms    T Offset 381 ms    QTC Fredericia 381 ms   Drug Screen, Urine   Result Value Ref Range    Amphetamine Screen, Urine Presumptive Positive (A) Presumptive Negative    Barbiturate Screen, Urine Presumptive Negative Presumptive Negative    Benzodiazepines Screen, Urine Presumptive Negative Presumptive Negative    Cannabinoid Screen, Urine Presumptive Negative Presumptive Negative    Cocaine Metabolite Screen, Urine Presumptive Negative Presumptive Negative    Fentanyl Screen, Urine Presumptive Positive (A) Presumptive Negative    Opiate Screen, Urine Presumptive Negative Presumptive Negative    Oxycodone Screen, Urine Presumptive Negative Presumptive Negative    PCP Screen, Urine Presumptive Negative Presumptive Negative    Methadone Screen, Urine Presumptive Negative Presumptive Negative   HIV 1/2 Antigen/Antibody Screen with Reflex to  Confirmation   Result Value Ref Range    HIV 1/2 Antigen/Antibody Screen with Reflex to Confirmation Nonreactive Nonreactive   CBC   Result Value Ref Range    WBC 3.6 (L) 4.4 - 11.3 x10*3/uL    nRBC 0.0 0.0 - 0.0 /100 WBCs    RBC 3.22 (L) 4.00 - 5.20 x10*6/uL    Hemoglobin 9.7 (L) 12.0 - 16.0 g/dL    Hematocrit 30.2 (L) 36.0 - 46.0 %    MCV 94 80 - 100 fL    MCH 30.1 26.0 - 34.0 pg    MCHC 32.1 32.0 - 36.0 g/dL    RDW 13.2 11.5 - 14.5 %    Platelets 146 (L) 150 - 450 x10*3/uL   Comprehensive metabolic panel   Result Value Ref Range    Glucose 111 (H) 74 - 99 mg/dL    Sodium 140 136 - 145 mmol/L    Potassium 3.8 3.5 - 5.3 mmol/L    Chloride 110 (H) 98 - 107 mmol/L    Bicarbonate 24 21 - 32 mmol/L    Anion Gap 10 10 - 20 mmol/L    Urea Nitrogen 15 6 - 23 mg/dL    Creatinine 0.60 0.50 - 1.05 mg/dL    eGFR >90 >60 mL/min/1.73m*2    Calcium 8.4 (L) 8.6 - 10.3 mg/dL    Albumin 3.4 3.4 - 5.0 g/dL    Alkaline Phosphatase 67 33 - 110 U/L    Total Protein 5.8 (L) 6.4 - 8.2 g/dL    AST 30 9 - 39 U/L    Bilirubin, Total 0.5 0.0 - 1.2 mg/dL    ALT 45 7 - 45 U/L   Magnesium   Result Value Ref Range    Magnesium 1.89 1.60 - 2.40 mg/dL   Phosphorus   Result Value Ref Range    Phosphorus 3.4 2.5 - 4.9 mg/dL     Electrocardiogram, 12-lead    Result Date: 9/28/2024  Sinus tachycardia Low voltage QRS Borderline ECG When compared with ECG of 25-AUG-2024 13:51, No significant change was found       Assessment/Plan   Assessment & Plan  Withdrawal from benzodiazepine, uncomplicated (Multi)    Suicidal ideation    Heroin withdrawal (Multi)    Methamphetamine dependence (Multi)      Rafal Roe MD

## 2024-09-30 VITALS
OXYGEN SATURATION: 98 % | WEIGHT: 115 LBS | HEART RATE: 80 BPM | DIASTOLIC BLOOD PRESSURE: 62 MMHG | BODY MASS INDEX: 18.48 KG/M2 | TEMPERATURE: 98.4 F | HEIGHT: 66 IN | SYSTOLIC BLOOD PRESSURE: 107 MMHG | RESPIRATION RATE: 16 BRPM

## 2024-09-30 LAB
ALBUMIN SERPL BCP-MCNC: 3.2 G/DL (ref 3.4–5)
ALP SERPL-CCNC: 67 U/L (ref 33–110)
ALT SERPL W P-5'-P-CCNC: 58 U/L (ref 7–45)
ANION GAP SERPL CALC-SCNC: 9 MMOL/L (ref 10–20)
AST SERPL W P-5'-P-CCNC: 37 U/L (ref 9–39)
BILIRUB SERPL-MCNC: 0.4 MG/DL (ref 0–1.2)
BUN SERPL-MCNC: 18 MG/DL (ref 6–23)
CALCIUM SERPL-MCNC: 8.2 MG/DL (ref 8.6–10.3)
CHLORIDE SERPL-SCNC: 107 MMOL/L (ref 98–107)
CO2 SERPL-SCNC: 26 MMOL/L (ref 21–32)
CREAT SERPL-MCNC: 0.75 MG/DL (ref 0.5–1.05)
EGFRCR SERPLBLD CKD-EPI 2021: >90 ML/MIN/1.73M*2
ERYTHROCYTE [DISTWIDTH] IN BLOOD BY AUTOMATED COUNT: 13.2 % (ref 11.5–14.5)
GLUCOSE SERPL-MCNC: 96 MG/DL (ref 74–99)
HCT VFR BLD AUTO: 31.4 % (ref 36–46)
HGB BLD-MCNC: 9.8 G/DL (ref 12–16)
MAGNESIUM SERPL-MCNC: 1.82 MG/DL (ref 1.6–2.4)
MCH RBC QN AUTO: 29.5 PG (ref 26–34)
MCHC RBC AUTO-ENTMCNC: 31.2 G/DL (ref 32–36)
MCV RBC AUTO: 95 FL (ref 80–100)
NRBC BLD-RTO: 0 /100 WBCS (ref 0–0)
PHOSPHATE SERPL-MCNC: 3.9 MG/DL (ref 2.5–4.9)
PLATELET # BLD AUTO: 175 X10*3/UL (ref 150–450)
POTASSIUM SERPL-SCNC: 3.7 MMOL/L (ref 3.5–5.3)
PROT SERPL-MCNC: 5.4 G/DL (ref 6.4–8.2)
RBC # BLD AUTO: 3.32 X10*6/UL (ref 4–5.2)
SODIUM SERPL-SCNC: 138 MMOL/L (ref 136–145)
WBC # BLD AUTO: 5.2 X10*3/UL (ref 4.4–11.3)

## 2024-09-30 PROCEDURE — 85027 COMPLETE CBC AUTOMATED: CPT | Performed by: NURSE PRACTITIONER

## 2024-09-30 PROCEDURE — 84100 ASSAY OF PHOSPHORUS: CPT | Performed by: NURSE PRACTITIONER

## 2024-09-30 PROCEDURE — 2500000001 HC RX 250 WO HCPCS SELF ADMINISTERED DRUGS (ALT 637 FOR MEDICARE OP)

## 2024-09-30 PROCEDURE — 2500000002 HC RX 250 W HCPCS SELF ADMINISTERED DRUGS (ALT 637 FOR MEDICARE OP, ALT 636 FOR OP/ED): Performed by: NURSE PRACTITIONER

## 2024-09-30 PROCEDURE — 36415 COLL VENOUS BLD VENIPUNCTURE: CPT | Performed by: NURSE PRACTITIONER

## 2024-09-30 PROCEDURE — 2500000001 HC RX 250 WO HCPCS SELF ADMINISTERED DRUGS (ALT 637 FOR MEDICARE OP): Performed by: NURSE PRACTITIONER

## 2024-09-30 PROCEDURE — 80053 COMPREHEN METABOLIC PANEL: CPT | Performed by: NURSE PRACTITIONER

## 2024-09-30 PROCEDURE — 2500000004 HC RX 250 GENERAL PHARMACY W/ HCPCS (ALT 636 FOR OP/ED): Performed by: NURSE PRACTITIONER

## 2024-09-30 PROCEDURE — 83735 ASSAY OF MAGNESIUM: CPT | Performed by: NURSE PRACTITIONER

## 2024-09-30 RX ORDER — POTASSIUM CHLORIDE 20 MEQ/1
20 TABLET, EXTENDED RELEASE ORAL ONCE
Status: COMPLETED | OUTPATIENT
Start: 2024-09-30 | End: 2024-09-30

## 2024-09-30 RX ORDER — ONDANSETRON 4 MG/1
4 TABLET, FILM COATED ORAL EVERY 8 HOURS PRN
Start: 2024-09-30

## 2024-09-30 RX ORDER — MULTIVIT-MIN/IRON FUM/FOLIC AC 7.5 MG-4
1 TABLET ORAL DAILY
Start: 2024-10-01

## 2024-09-30 RX ORDER — TRAZODONE HYDROCHLORIDE 100 MG/1
100 TABLET ORAL NIGHTLY PRN
Start: 2024-09-30

## 2024-09-30 RX ORDER — QUETIAPINE FUMARATE 25 MG/1
25 TABLET, FILM COATED ORAL EVERY 12 HOURS
Start: 2024-09-30

## 2024-09-30 RX ORDER — LANOLIN ALCOHOL/MO/W.PET/CERES
800 CREAM (GRAM) TOPICAL ONCE
Status: COMPLETED | OUTPATIENT
Start: 2024-09-30 | End: 2024-09-30

## 2024-09-30 RX ORDER — HYDROXYZINE HYDROCHLORIDE 25 MG/1
25 TABLET, FILM COATED ORAL EVERY 4 HOURS PRN
Start: 2024-09-30

## 2024-09-30 RX ORDER — DICYCLOMINE HYDROCHLORIDE 10 MG/1
10 CAPSULE ORAL EVERY 6 HOURS PRN
Start: 2024-09-30

## 2024-09-30 RX ORDER — FOLIC ACID 1 MG/1
1 TABLET ORAL DAILY
Start: 2024-10-01

## 2024-09-30 ASSESSMENT — LIFESTYLE VARIABLES
BLOOD PRESSURE: 99/64
TREMOR: NOT VISIBLE, BUT CAN BE FELT FINGERTIP TO FINGERTIP
ANXIETY: NO ANXIETY, AT EASE
TREMOR: NO TREMOR
AUDITORY DISTURBANCES: NOT PRESENT
ANXIETY: NO ANXIETY, AT EASE
TOTAL_SCORE: 2
TOTAL_SCORE: 3
HEADACHE, FULLNESS IN HEAD: VERY MILD
AGITATION: NORMAL ACTIVITY
TOTAL SCORE: 4
PAROXYSMAL SWEATS: 2
PAROXYSMAL SWEATS: 3
ORIENTATION AND CLOUDING OF SENSORIUM: ORIENTED AND CAN DO SERIAL ADDITIONS
TOTAL_SCORE: 8
NAUSEA AND VOMITING: MILD NAUSEA WITH NO VOMITING
VISUAL DISTURBANCES: NOT PRESENT
VISUAL DISTURBANCES: NOT PRESENT
TOTAL SCORE: 5
TOTAL_SCORE: 2
AUDITORY DISTURBANCES: NOT PRESENT
ORIENTATION AND CLOUDING OF SENSORIUM: ORIENTED AND CAN DO SERIAL ADDITIONS
HEADACHE, FULLNESS IN HEAD: VERY MILD
NAUSEA AND VOMITING: NO NAUSEA AND NO VOMITING
AGITATION: NORMAL ACTIVITY

## 2024-09-30 ASSESSMENT — PAIN SCALES - GENERAL
PAINLEVEL_OUTOF10: 3
PAINLEVEL_OUTOF10: 0 - NO PAIN

## 2024-09-30 ASSESSMENT — COGNITIVE AND FUNCTIONAL STATUS - GENERAL
DAILY ACTIVITIY SCORE: 24
MOBILITY SCORE: 24

## 2024-09-30 ASSESSMENT — PAIN - FUNCTIONAL ASSESSMENT
PAIN_FUNCTIONAL_ASSESSMENT: 0-10
PAIN_FUNCTIONAL_ASSESSMENT: 0-10

## 2024-09-30 ASSESSMENT — COLUMBIA-SUICIDE SEVERITY RATING SCALE - C-SSRS
5. HAVE YOU STARTED TO WORK OUT OR WORKED OUT THE DETAILS OF HOW TO KILL YOURSELF? DO YOU INTEND TO CARRY OUT THIS PLAN?: NO
2. HAVE YOU ACTUALLY HAD ANY THOUGHTS OF KILLING YOURSELF?: NO
1. SINCE LAST CONTACT, HAVE YOU WISHED YOU WERE DEAD OR WISHED YOU COULD GO TO SLEEP AND NOT WAKE UP?: NO
6. HAVE YOU EVER DONE ANYTHING, STARTED TO DO ANYTHING, OR PREPARED TO DO ANYTHING TO END YOUR LIFE?: NO

## 2024-09-30 NOTE — NURSING NOTE
0700: Assumed care of pt. At this time.    1000: Message received by social work from [facility] at this time and the pt. Is accepted at [facility] and pending a pink slip. Message sent to team at this time regarding the need for documentation in order to further plan for DC. This nurse to continue to follow throughout this shift.    1110: Still pending response from team in regards to pink slip needing obtained. The psychiatry MD states that he does not have access until after 1pm to enter it himself. Another message sent to primary team to see if MD or NP can fill paperwork out. Will follow.    1500: This nurse called report at this time to [facility] at [facility]. The pt transport time set for 1530. There are no further questions or concerns from the facility nurse at this time and CB number given to the nurse for any additional needs.    1558: Pt. Transport here to get the pt. At this time. The pt. Calm and cooperative and report given to the transport team. The pt. IV removed and heart monitor taken off at this time. No further needs at this time.

## 2024-09-30 NOTE — CARE PLAN
Problem: Sensory Perceptual Alteration as Evidenced by  Goal: Participates in unit activities  Outcome: Progressing

## 2024-09-30 NOTE — SIGNIFICANT EVENT
Application for Emergency Admission      Ready for Transfer?  Is the patient medically cleared for transfer to inpatient psychiatry: Yes  Has the patient been accepted to an inpatient psychiatric hospital: Yes    Application for Emergency Admission  IN ACCORDANCE WITH [place]  The Chief Clinical Officer of: Lynne Campos 9/30/2024 .1:59 PM    Reason for Hospitalization  The undersigned has reason to believe that: Judith Fierro Is a mentally ill person subject to hospitalization by court order under division B Section 5122.01 of the Revised Code, i.e., this person:    1.Yes  Represents a substantial risk of physical harm to self as manifested by evidence of threats of, or attempts at, suicide or serious self-inflicted bodily harm    2.No Represents a substantial risk of physical harm to others as manifested by evidence of recent homicidal or other violent behavior, evidence of recent threats that place another in reasonable fear of violent behavior and serious physical harm, or other evidence of present dangerousness    3.No Represents a substantial and immediate risk of serious physical impairment or injury to self as manifested by  evidence that the person is unable to provide for and is not providing for the person's basic physical needs because of the person's mental illness and that appropriate provision for those needs cannot be made  immediately available in the community    4.Yes Would benefit from treatment in a hospital for his mental illness and is in need of such treatment as manifested by evidence of behavior that creates a grave and imminent risk to substantial rights of others or  himself.    5.No Would benefit from treatment as manifested by evidence of behavior that indicates all of the following:       (a) The person is unlikely to survive safely in the community without supervision, based on a clinical determination.       (b) The person has a history of lack of compliance  with treatment for mental illness and one of the following applies:      (i) At least twice within the thirty-six months prior to the filing of an affidavit seeking court-ordered treatment of the person under section 5122.111 of the Revised Code, the lack of compliance has been a significant factor in necessitating hospitalization in a hospital or receipt of services in a forensic or other mental health unit of a correctional facility, provided that the thirty-six-month period shall be extended by the length of any hospitalization or incarceration of the person that occurred within the thirty-six-month period.      (ii) Within the forty-eight months prior to the filing of an affidavit seeking court-ordered treatment of the person under section 5122.111 of the Revised Code, the lack of compliance resulted in one or more acts of serious violent behavior toward self or others or threats of, or attempts at, serious physical harm to self or others, provided that the forty-eight-month period shall be extended by the length of any hospitalization or incarceration of the person that occurred within the forty-eight-month period.      (c) The person, as a result of mental illness, is unlikely to voluntarily participate in necessary treatment.       (d) In view of the person's treatment history and current behavior, the person is in need of treatment in order to prevent a relapse or deterioration that would be likely to result in substantial risk of serious harm to the person or others.    (e) Represents a substantial risk of physical harm to self or others if allowed to remain at liberty pending examination.    Therefore, it is requested that said person be admitted to the above named facility.    STATEMENT OF BELIEF    Must be filled out by one of the following: a psychiatrist, licensed physician, licensed clinical psychologist, health or ,  or .  (Statement shall include the circumstances  under which the individual was taken into custody and the reason for the person's belief that hospitalization is necessary. The statement shall also include a reference to efforts made to secure the individual's property at his residence if he was taken into custody there. Every reasonable and appropriate effort should be made to take this person into custody in the least conspicuous manner possible.)    Pt with depression and suicidal thoughts. Need further assessment in IP setting     Rafal Roe MD 9/30/2024     _____________________________________________________________   Place of Employment: [employer]    STATEMENT OF OBSERVATION BY PSYCHIATRIST, LICENSED PHYSICIAN, OR LICENSED CLINICAL PSYCHOLOGIST, IF APPLICABLE    Place of Observation (e.g., [facility] mental health center, general hospital, office, emergency facility)  (If applicable, please complete)    Rafal Roe MD 9/30/2024    _____________________________________________________________

## 2024-10-02 NOTE — PROGRESS NOTES
Spiritual Care Visit    Clinical Encounter Type  Visited With: Patient and family together  Routine Visit: Introduction  Continue Visiting: No        Taxonomy  Intended Effects: Aligning care plan with patient's values, Establish rapport and connectedness, Meaning-making, Preserve dignity and respect, Build relationship of care and support, Convey a calming presence, Promote sense of peace, Demonstrate caring and concern, Lessen anxiety, Lessen someone's feelings of isolation, Helping someone feel comforted, Cortney affirmation

## 2024-10-06 LAB
ATRIAL RATE: 121 BPM
P AXIS: 67 DEGREES
P OFFSET: 208 MS
P ONSET: 155 MS
PR INTERVAL: 150 MS
Q ONSET: 230 MS
QRS COUNT: 20 BEATS
QRS DURATION: 88 MS
QT INTERVAL: 302 MS
QTC CALCULATION(BAZETT): 428 MS
QTC FREDERICIA: 381 MS
R AXIS: 61 DEGREES
T AXIS: 58 DEGREES
T OFFSET: 381 MS
VENTRICULAR RATE: 121 BPM
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
